# Patient Record
Sex: FEMALE | Race: BLACK OR AFRICAN AMERICAN | NOT HISPANIC OR LATINO | Employment: UNEMPLOYED | URBAN - METROPOLITAN AREA
[De-identification: names, ages, dates, MRNs, and addresses within clinical notes are randomized per-mention and may not be internally consistent; named-entity substitution may affect disease eponyms.]

---

## 2019-03-15 ENCOUNTER — HOSPITAL ENCOUNTER (EMERGENCY)
Facility: HOSPITAL | Age: 24
Discharge: HOME | End: 2019-03-15
Attending: EMERGENCY MEDICINE
Payer: MEDICAID

## 2019-03-15 VITALS
WEIGHT: 173.06 LBS | BODY MASS INDEX: 27.16 KG/M2 | SYSTOLIC BLOOD PRESSURE: 99 MMHG | TEMPERATURE: 98 F | HEART RATE: 75 BPM | OXYGEN SATURATION: 100 % | DIASTOLIC BLOOD PRESSURE: 58 MMHG | HEIGHT: 67 IN | RESPIRATION RATE: 18 BRPM

## 2019-03-15 DIAGNOSIS — K59.00 CONSTIPATION, UNSPECIFIED CONSTIPATION TYPE: Primary | ICD-10-CM

## 2019-03-15 DIAGNOSIS — R10.9 ABDOMINAL PAIN: ICD-10-CM

## 2019-03-15 LAB
B-HCG UR QL: NEGATIVE
BILIRUB UR QL STRIP: NEGATIVE
CLARITY UR: CLEAR
COLOR UR: YELLOW
GLUCOSE UR QL STRIP: NEGATIVE
HGB UR QL STRIP: NEGATIVE
KETONES UR QL STRIP: NEGATIVE
LEUKOCYTE ESTERASE UR QL STRIP: NEGATIVE
NITRITE UR QL STRIP: NEGATIVE
PH UR STRIP: 8 [PH] (ref 5–8)
PROT UR QL STRIP: NEGATIVE
SP GR UR STRIP: 1.01 (ref 1–1.03)
URN SPEC COLLECT METH UR: ABNORMAL
UROBILINOGEN UR STRIP-ACNC: ABNORMAL EU/DL

## 2019-03-15 PROCEDURE — 81003 URINALYSIS AUTO W/O SCOPE: CPT

## 2019-03-15 PROCEDURE — 81025 URINE PREGNANCY TEST: CPT

## 2019-03-15 PROCEDURE — 99284 EMERGENCY DEPT VISIT MOD MDM: CPT

## 2019-03-15 RX ORDER — DOCUSATE SODIUM 100 MG/1
100 CAPSULE, LIQUID FILLED ORAL 2 TIMES DAILY
Qty: 60 CAPSULE | Refills: 0 | COMMUNITY
Start: 2019-03-15

## 2019-03-15 NOTE — ED PROVIDER NOTES
"SCRIBE #1 NOTE: I, Alicia Ballesteros, am scribing for, and in the presence of, Marietta Sheth Do, MD. I have scribed the entire note.         History     Chief Complaint   Patient presents with    Abdominal Pain     Pt c/o cramping abdominal pain, reports constipation, states "I haven't had a normal BM in months, I have constipation issues, and I have not had a BM even after drinking MOM and trying other laxatives."       Review of patient's allergies indicates:  No Known Allergies      History of Present Illness   HPI    3/15/2019, 2:52 AM  History obtained from the patient      History of Present Illness: Katy Sweeney is a 23 y.o. female patient who presents to the Emergency Department for abdominal pain which onset gradually tonight.  Symptoms are constant and moderate in severity. No mitigating or exacerbating factors reported. Associated sxs include constipation. Patient states she has not had a good BM in months. Patient denies any fever, chills, dysuria, hematuria, hematochezia, N/V/D, and all other sxs at this time. Patient states she previously took milk of magnesia and would have a small BM, but has not taken it recently. Patient states she has not been evaluated by GI. No further complaints or concerns at this time.     Arrival mode: Personal vehicle      PCP: Rob Rg MD        Past Medical History:  Past medical history reviewed not relevant      Past Surgical History:  Past surgical history reviewed not relevant      Family History:  Family History   Problem Relation Age of Onset    Breast cancer Neg Hx     Colon cancer Neg Hx     Ovarian cancer Neg Hx        Social History:  Social History     Tobacco Use    Smoking status: Never Smoker    Smokeless tobacco: Never Used   Substance and Sexual Activity    Alcohol use: Yes    Drug use: Yes     Types: Marijuana    Sexual activity: Yes     Partners: Male     Birth control/protection: None        Review of Systems   Review of Systems "   Constitutional: Negative for chills and fever.   HENT: Negative for sore throat.    Respiratory: Negative for shortness of breath.    Cardiovascular: Negative for chest pain.   Gastrointestinal: Positive for abdominal pain and constipation. Negative for blood in stool, diarrhea, nausea and vomiting.   Genitourinary: Negative for dysuria and hematuria.   Musculoskeletal: Negative for back pain.   Skin: Negative for rash.   Neurological: Negative for weakness.   Hematological: Does not bruise/bleed easily.   All other systems reviewed and are negative.       Physical Exam     Initial Vitals [03/15/19 0226]   BP Pulse Resp Temp SpO2   118/69 77 18 98.1 °F (36.7 °C) 100 %      MAP       --          Physical Exam  Nursing Notes and Vital Signs Reviewed.  Constitutional: Patient is in no acute distress. Well-developed and well-nourished.  Head: Atraumatic. Normocephalic.  Eyes: PERRL. EOM intact. Conjunctivae are not pale. No scleral icterus.  ENT: Mucous membranes are moist. Oropharynx is clear and symmetric.    Neck: Supple. Full ROM. No lymphadenopathy.  Cardiovascular: Regular rate. Regular rhythm. No murmurs, rubs, or gallops. Distal pulses are 2+ and symmetric.  Pulmonary/Chest: No respiratory distress. Clear to auscultation bilaterally. No wheezing or rales.  Abdominal: Soft and non-distended.  There is no tenderness.  No rebound, guarding, or rigidity. Good bowel sounds.  Rectal:  No tenderness.  No masses.  No hemorrhoids.  Normal sphincter tone.  Vault is empty.  Musculoskeletal: Moves all extremities. No obvious deformities.   Skin: Warm and dry.  Neurological:  Alert, awake, and appropriate.  Normal speech.  No acute focal neurological deficits are appreciated.  Psychiatric: Normal affect. Good eye contact. Appropriate in content.     ED Course   Procedures  ED Vital Signs:  Vitals:    03/15/19 0226 03/15/19 0250 03/15/19 0300 03/15/19 0330   BP: 118/69 107/63 104/65 100/67   Pulse: 77  79 79   Resp: 18     "  Temp: 98.1 °F (36.7 °C)      TempSrc: Oral      SpO2: 100%  100% 100%   Weight: 78.5 kg (173 lb 1 oz)      Height: 5' 7" (1.702 m)       03/15/19 0400   BP: (!) 99/54   Pulse: 81   Resp:    Temp:    TempSrc:    SpO2: 100%   Weight:    Height:        Abnormal Lab Results:  Labs Reviewed   URINALYSIS, REFLEX TO URINE CULTURE - Abnormal; Notable for the following components:       Result Value    Urobilinogen, UA 4.0-6.0 (*)     All other components within normal limits    Narrative:     Preferred Collection Type->Urine, Clean Catch   PREGNANCY TEST, URINE RAPID        All Lab Results:  Results for orders placed or performed during the hospital encounter of 03/15/19   Urinalysis, Reflex to Urine Culture Urine, Clean Catch   Result Value Ref Range    Specimen UA Urine, Clean Catch     Color, UA Yellow Yellow, Straw, Quynh    Appearance, UA Clear Clear    pH, UA 8.0 5.0 - 8.0    Specific Gravity, UA 1.015 1.005 - 1.030    Protein, UA Negative Negative    Glucose, UA Negative Negative    Ketones, UA Negative Negative    Bilirubin (UA) Negative Negative    Occult Blood UA Negative Negative    Nitrite, UA Negative Negative    Urobilinogen, UA 4.0-6.0 (A) <2.0 EU/dL    Leukocytes, UA Negative Negative   Pregnancy, urine rapid   Result Value Ref Range    Preg Test, Ur Negative        Imaging Results:  Imaging Results          X-Ray Abdomen AP 1 View (KUB) (In process)                  Ordered, reviewed, and independently interpreted by the ED provider.  Study: X-ray Abdomen  Findings: NAF, stool noted, no free air or dilated loops or air fluid levels            The Emergency Provider reviewed the vital signs and test results, which are outlined above.     ED Discussion     4:22 AM: Reassessed pt at this time. Discussed with pt all pertinent ED information and results. Discussed pt dx and plan of tx. Gave pt all f/u and return to the ED instructions. All questions and concerns were addressed at this time. Pt expresses " understanding of information and instructions, and is comfortable with plan to discharge. Pt is stable for discharge.    I discussed with patient and/or family/caretaker that evaluation in the ED does not suggest any emergent or life threatening medical conditions requiring immediate intervention beyond what was provided in the ED, and I believe patient is safe for discharge.  Regardless, an unremarkable evaluation in the ED does not preclude the development or presence of a serious of life threatening condition. As such, patient was instructed to return immediately for any worsening or change in current symptoms.          ED Medication(s):  Medications - No data to display  New Prescriptions    DOCUSATE SODIUM (COLACE) 100 MG CAPSULE    Take 1 capsule (100 mg total) by mouth 2 (two) times daily.                Medical Decision Making     Medical Decision Making:   Clinical Tests:   Lab Tests: Ordered and Reviewed  Radiological Study: Ordered and Reviewed             Scribe Attestation:   Scribe #1: I performed the above scribed service and the documentation accurately describes the services I performed. I attest to the accuracy of the note.     Attending:   Physician Attestation Statement for Scribe #1: I, Marietta Sheth Do, MD, personally performed the services described in this documentation, as scribed by Alicia Ballesteros, in my presence, and it is both accurate and complete.           Clinical Impression       ICD-10-CM ICD-9-CM   1. Constipation, unspecified constipation type K59.00 564.00   2. Abdominal pain R10.9 789.00       Disposition:   Disposition: Discharged  Condition: Stable         Marietta Sheth Do, MD  03/15/19 0525

## 2021-01-09 ENCOUNTER — HOSPITAL ENCOUNTER (EMERGENCY)
Facility: HOSPITAL | Age: 26
Discharge: HOME | End: 2021-01-09
Attending: EMERGENCY MEDICINE
Payer: MEDICAID

## 2021-01-09 VITALS
SYSTOLIC BLOOD PRESSURE: 110 MMHG | HEART RATE: 80 BPM | TEMPERATURE: 100 F | WEIGHT: 180.69 LBS | RESPIRATION RATE: 20 BRPM | HEIGHT: 67 IN | DIASTOLIC BLOOD PRESSURE: 76 MMHG | OXYGEN SATURATION: 98 % | BODY MASS INDEX: 28.36 KG/M2

## 2021-01-09 DIAGNOSIS — K59.00 CONSTIPATION, UNSPECIFIED CONSTIPATION TYPE: ICD-10-CM

## 2021-01-09 DIAGNOSIS — R10.84 GENERALIZED ABDOMINAL PAIN: ICD-10-CM

## 2021-01-09 DIAGNOSIS — R10.9 ABDOMINAL PAIN: Primary | ICD-10-CM

## 2021-01-09 LAB
B-HCG UR QL: NEGATIVE
BILIRUB UR QL STRIP: NEGATIVE
CLARITY UR: CLEAR
COLOR UR: YELLOW
GLUCOSE UR QL STRIP: NEGATIVE
HGB UR QL STRIP: NEGATIVE
KETONES UR QL STRIP: NEGATIVE
LEUKOCYTE ESTERASE UR QL STRIP: NEGATIVE
NITRITE UR QL STRIP: NEGATIVE
PH UR STRIP: 7 [PH] (ref 5–8)
PROT UR QL STRIP: NEGATIVE
SP GR UR STRIP: >=1.03 (ref 1–1.03)
URN SPEC COLLECT METH UR: ABNORMAL
UROBILINOGEN UR STRIP-ACNC: 1 EU/DL

## 2021-01-09 PROCEDURE — 81025 URINE PREGNANCY TEST: CPT

## 2021-01-09 PROCEDURE — 81003 URINALYSIS AUTO W/O SCOPE: CPT

## 2021-01-09 PROCEDURE — 99284 EMERGENCY DEPT VISIT MOD MDM: CPT | Mod: 25

## 2021-01-09 RX ORDER — POLYETHYLENE GLYCOL 3350 17 G/17G
17 POWDER, FOR SOLUTION ORAL DAILY
Qty: 119 G | Refills: 0 | Status: SHIPPED | OUTPATIENT
Start: 2021-01-09 | End: 2021-01-16

## 2021-01-09 RX ORDER — DOCUSATE SODIUM 100 MG/1
100 CAPSULE, LIQUID FILLED ORAL 3 TIMES DAILY PRN
Qty: 30 CAPSULE | Refills: 0 | Status: SHIPPED | OUTPATIENT
Start: 2021-01-09

## 2021-01-09 RX ORDER — SIMETHICONE 125 MG
125 TABLET,CHEWABLE ORAL EVERY 6 HOURS PRN
Qty: 30 TABLET | Refills: 0 | Status: SHIPPED | OUTPATIENT
Start: 2021-01-09

## 2021-01-25 ENCOUNTER — HOSPITAL ENCOUNTER (EMERGENCY)
Facility: HOSPITAL | Age: 26
Discharge: HOME | End: 2021-01-25
Attending: EMERGENCY MEDICINE
Payer: MEDICAID

## 2021-01-25 VITALS
RESPIRATION RATE: 20 BRPM | SYSTOLIC BLOOD PRESSURE: 107 MMHG | WEIGHT: 179 LBS | DIASTOLIC BLOOD PRESSURE: 67 MMHG | BODY MASS INDEX: 28.04 KG/M2 | HEART RATE: 70 BPM | TEMPERATURE: 99 F | OXYGEN SATURATION: 95 %
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
## 2021-01-25 DIAGNOSIS — S06.0X0A CONCUSSION WITHOUT LOSS OF CONSCIOUSNESS, INITIAL ENCOUNTER: ICD-10-CM

## 2021-01-25 PROCEDURE — 99284 EMERGENCY DEPT VISIT MOD MDM: CPT | Mod: 25

## 2021-01-25 RX ORDER — NAPROXEN 375 MG/1
375 TABLET ORAL 2 TIMES DAILY WITH MEALS
Qty: 30 TABLET | Refills: 0 | Status: SHIPPED | OUTPATIENT
Start: 2021-01-25 | End: 2023-04-27 | Stop reason: SDUPTHER

## 2021-01-25 RX ORDER — ONDANSETRON 4 MG/1
4 TABLET, ORALLY DISINTEGRATING ORAL EVERY 6 HOURS PRN
Qty: 15 TABLET | Refills: 0 | OUTPATIENT
Start: 2021-01-25 | End: 2023-09-29

## 2021-03-15 ENCOUNTER — HOSPITAL ENCOUNTER (EMERGENCY)
Facility: HOSPITAL | Age: 26
Discharge: HOME | End: 2021-03-15
Attending: EMERGENCY MEDICINE
Payer: MEDICAID

## 2021-03-15 VITALS
BODY MASS INDEX: 27.68 KG/M2 | HEIGHT: 67 IN | RESPIRATION RATE: 16 BRPM | OXYGEN SATURATION: 96 % | TEMPERATURE: 98 F | WEIGHT: 176.38 LBS | SYSTOLIC BLOOD PRESSURE: 104 MMHG | DIASTOLIC BLOOD PRESSURE: 70 MMHG | HEART RATE: 76 BPM

## 2021-03-15 DIAGNOSIS — K59.00 CONSTIPATION, UNSPECIFIED CONSTIPATION TYPE: Primary | ICD-10-CM

## 2021-03-15 PROCEDURE — 99284 EMERGENCY DEPT VISIT MOD MDM: CPT

## 2021-03-15 RX ORDER — SYRING-NEEDL,DISP,INSUL,0.3 ML 29 G X1/2"
296 SYRINGE, EMPTY DISPOSABLE MISCELLANEOUS ONCE
Qty: 1 BOTTLE | Refills: 0 | Status: SHIPPED | OUTPATIENT
Start: 2021-03-15 | End: 2021-03-15

## 2023-03-06 ENCOUNTER — HOSPITAL ENCOUNTER (EMERGENCY)
Facility: HOSPITAL | Age: 28
Discharge: HOME | End: 2023-03-06
Attending: EMERGENCY MEDICINE
Payer: MEDICAID

## 2023-03-06 VITALS
HEIGHT: 67 IN | SYSTOLIC BLOOD PRESSURE: 119 MMHG | OXYGEN SATURATION: 97 % | RESPIRATION RATE: 20 BRPM | TEMPERATURE: 99 F | WEIGHT: 160 LBS | HEART RATE: 81 BPM | DIASTOLIC BLOOD PRESSURE: 67 MMHG | BODY MASS INDEX: 25.11 KG/M2

## 2023-03-06 DIAGNOSIS — J06.9 VIRAL URI WITH COUGH: Primary | ICD-10-CM

## 2023-03-06 LAB
B-HCG UR QL: NEGATIVE
CTP QC/QA: YES
MOLECULAR STREP A: NEGATIVE
POC MOLECULAR INFLUENZA A AGN: NEGATIVE
POC MOLECULAR INFLUENZA B AGN: NEGATIVE
SARS-COV-2 RDRP RESP QL NAA+PROBE: NEGATIVE

## 2023-03-06 PROCEDURE — 87502 INFLUENZA DNA AMP PROBE: CPT

## 2023-03-06 PROCEDURE — 25000003 PHARM REV CODE 250

## 2023-03-06 PROCEDURE — 87651 STREP A DNA AMP PROBE: CPT

## 2023-03-06 PROCEDURE — 81025 URINE PREGNANCY TEST: CPT | Performed by: EMERGENCY MEDICINE

## 2023-03-06 PROCEDURE — 99284 EMERGENCY DEPT VISIT MOD MDM: CPT

## 2023-03-06 RX ORDER — FLUTICASONE PROPIONATE 50 MCG
2 SPRAY, SUSPENSION (ML) NASAL DAILY
Qty: 15.8 ML | Refills: 0 | Status: SHIPPED | OUTPATIENT
Start: 2023-03-06 | End: 2023-03-06 | Stop reason: SDUPTHER

## 2023-03-06 RX ORDER — ACETAMINOPHEN 500 MG
1000 TABLET ORAL
Status: COMPLETED | OUTPATIENT
Start: 2023-03-06 | End: 2023-03-06

## 2023-03-06 RX ORDER — IBUPROFEN 600 MG/1
600 TABLET ORAL EVERY 6 HOURS PRN
Qty: 20 TABLET | Refills: 0 | Status: SHIPPED | OUTPATIENT
Start: 2023-03-06 | End: 2023-09-29

## 2023-03-06 RX ORDER — CETIRIZINE HYDROCHLORIDE 10 MG/1
10 TABLET ORAL DAILY
Qty: 30 TABLET | Refills: 0 | Status: SHIPPED | OUTPATIENT
Start: 2023-03-06 | End: 2023-03-06 | Stop reason: SDUPTHER

## 2023-03-06 RX ORDER — BENZONATATE 200 MG/1
200 CAPSULE ORAL 3 TIMES DAILY PRN
Qty: 30 CAPSULE | Refills: 0 | Status: SHIPPED | OUTPATIENT
Start: 2023-03-06 | End: 2023-03-16

## 2023-03-06 RX ORDER — CETIRIZINE HYDROCHLORIDE 10 MG/1
10 TABLET ORAL DAILY
Qty: 30 TABLET | Refills: 0 | Status: SHIPPED | OUTPATIENT
Start: 2023-03-06 | End: 2024-03-05

## 2023-03-06 RX ORDER — IBUPROFEN 600 MG/1
600 TABLET ORAL EVERY 6 HOURS PRN
Qty: 20 TABLET | Refills: 0 | Status: SHIPPED | OUTPATIENT
Start: 2023-03-06 | End: 2023-03-06 | Stop reason: SDUPTHER

## 2023-03-06 RX ORDER — GUAIFENESIN/DEXTROMETHORPHAN 100-10MG/5
5 SYRUP ORAL EVERY 6 HOURS PRN
Qty: 473 ML | Refills: 0 | Status: SHIPPED | OUTPATIENT
Start: 2023-03-06 | End: 2023-03-16

## 2023-03-06 RX ORDER — FLUTICASONE PROPIONATE 50 MCG
2 SPRAY, SUSPENSION (ML) NASAL DAILY
Qty: 15.8 ML | Refills: 0 | Status: SHIPPED | OUTPATIENT
Start: 2023-03-06 | End: 2023-04-05

## 2023-03-06 RX ORDER — BENZONATATE 200 MG/1
200 CAPSULE ORAL 3 TIMES DAILY PRN
Qty: 30 CAPSULE | Refills: 0 | Status: SHIPPED | OUTPATIENT
Start: 2023-03-06 | End: 2023-03-06 | Stop reason: SDUPTHER

## 2023-03-06 RX ORDER — ACETAMINOPHEN 500 MG
1000 TABLET ORAL EVERY 6 HOURS PRN
Qty: 56 TABLET | Refills: 0 | Status: SHIPPED | OUTPATIENT
Start: 2023-03-06 | End: 2023-03-13

## 2023-03-06 RX ORDER — ACETAMINOPHEN 500 MG
1000 TABLET ORAL EVERY 6 HOURS PRN
Qty: 56 TABLET | Refills: 0 | Status: SHIPPED | OUTPATIENT
Start: 2023-03-06 | End: 2023-03-06 | Stop reason: SDUPTHER

## 2023-03-06 RX ORDER — GUAIFENESIN/DEXTROMETHORPHAN 100-10MG/5
5 SYRUP ORAL EVERY 6 HOURS PRN
Qty: 473 ML | Refills: 0 | Status: SHIPPED | OUTPATIENT
Start: 2023-03-06 | End: 2023-03-06 | Stop reason: SDUPTHER

## 2023-03-06 RX ADMIN — ACETAMINOPHEN 1000 MG: 500 TABLET ORAL at 01:03

## 2023-03-06 NOTE — DISCHARGE INSTRUCTIONS
Thank you for coming to our Emergency Department today. It is important to remember that some problems are difficult to diagnose and may not be found during your first visit. Be sure to follow up with your primary care doctor and review any labs/imaging that was performed with them. If you do not have a primary care doctor, you may contact the one listed on your discharge paperwork or you may also call the [facility] Appointment Desk at [phone] to schedule an appointment with one.     All medications may potentially have side effects and it is impossible to predict which medications may give you side effects. If you feel that you are having a negative effect of any medication you should immediately stop taking them and seek medical attention.    Return to the ER with any questions/concerns, new/concerning symptoms, worsening or failure to improve. Do not drive or make any important decisions for 24 hours if you have received any pain medications, sedatives or mood altering drugs during your ER visit.

## 2023-03-06 NOTE — FIRST PROVIDER EVALUATION
Emergency Department TeleTriage Encounter Note      CHIEF COMPLAINT    Chief Complaint   Patient presents with    Sore Throat     Pt states she has had a sore throat since yesterday.       VITAL SIGNS   Initial Vitals [03/06/23 1124]   BP Pulse Resp Temp SpO2   108/69 89 18 98 °F (36.7 °C) --      MAP       --            ALLERGIES    Review of patient's allergies indicates:  No Known Allergies    PROVIDER TRIAGE NOTE  This is a teletriage evaluation of a 27 y.o. female presenting to the ED complaining of body aches, rhinorrhea, and sore throat since yesterday.  Has dry cough.     Managing secretions, no resp distress.     Initial orders will be placed and care will be transferred to an alternate provider when patient is roomed for a full evaluation. Any additional orders and the final disposition will be determined by that provider.         ORDERS  Labs Reviewed   POCT INFLUENZA A/B MOLECULAR   SARS-COV-2 RDRP GENE   POCT URINE PREGNANCY   POCT STREP A MOLECULAR       ED Orders (720h ago, onward)      Start Ordered     Status Ordering Provider    03/06/23 1127 03/06/23 1126  POCT Influenza A/B Molecular  Once         Ordered GRUPO HINTON    03/06/23 1127 03/06/23 1126  POCT COVID-19 Rapid Screening  Once         Ordered GRUPO HINTON    03/06/23 1127 03/06/23 1126  POCT urine pregnancy  Once         Ordered GRUPO HINTON    03/06/23 1127 03/06/23 1126  POCT Strep A, Molecular  Once         Ordered GRUPO HINTON              Virtual Visit Note: The provider triage portion of this emergency department evaluation and documentation was performed via [employer], a HIPAA-compliant telemedicine application, in concert with a tele-presenter in the room. A face to face patient evaluation with one of my colleagues will occur once the patient is placed in an emergency department room.      DISCLAIMER: This note was prepared with [employer] voice recognition transcription software. Garbled syntax, mangled pronouns, and  other bizarre constructions may be attributed to that software system.

## 2023-03-06 NOTE — ED TRIAGE NOTES
Pt to the ED with complaints of a sore throat since yesterday. Pt denies trouble breathing or fever/chills.

## 2023-03-07 NOTE — ED PROVIDER NOTES
Encounter Date: 3/6/2023       History     Chief Complaint   Patient presents with    Sore Throat     Pt states she has had a sore throat since yesterday.     Katy Sweeney is a 27-year-old female who presents to the emergency department with a chief complaint of upper respiratory symptoms.  She was in her normal state of health until last night when she had slow development of upper respiratory symptoms including headache, rhinorrhea, sore throat, myalgias, and sneezing.  She also reports subjective fever but does not know what her temperature was.  She denies any shortness of breath, dyspnea, or chest pain.  She has not taken any medications to attempt to alleviate her symptoms.  She has no medical history, does not take any daily medications, and has no known drug allergies.    The history is provided by the patient. No  was used.   Review of patient's allergies indicates:  No Known Allergies  History reviewed. No pertinent past medical history.  History reviewed. No pertinent surgical history.  Family History   Problem Relation Age of Onset    Breast cancer Neg Hx     Colon cancer Neg Hx     Ovarian cancer Neg Hx      Social History     Tobacco Use    Smoking status: Never    Smokeless tobacco: Never   Substance Use Topics    Alcohol use: Yes    Drug use: Yes     Types: Marijuana     Review of Systems   Constitutional:  Positive for chills and fever (Subjective).   HENT:  Positive for rhinorrhea, sneezing and sore throat.    Respiratory:  Negative for shortness of breath.    Cardiovascular:  Negative for chest pain.   Gastrointestinal:  Negative for nausea.   Genitourinary:  Negative for dysuria.   Musculoskeletal:  Positive for myalgias. Negative for back pain.   Skin:  Negative for rash.   Neurological:  Positive for headaches. Negative for weakness.   Hematological:  Does not bruise/bleed easily.     Physical Exam     Initial Vitals   BP Pulse Resp Temp SpO2   03/06/23 1124 03/06/23 1124  03/06/23 1124 03/06/23 1124 03/06/23 1312   108/69 89 18 98 °F (36.7 °C) 97 %      MAP       --                Physical Exam    Nursing note and vitals reviewed.  Constitutional: She appears well-developed and well-nourished. She is not diaphoretic. She is cooperative. She does not appear ill. No distress.   HENT:   Head: Normocephalic and atraumatic.   Right Ear: Hearing, tympanic membrane, external ear and ear canal normal. Tympanic membrane is not injected.   Left Ear: Hearing, tympanic membrane, external ear and ear canal normal. Tympanic membrane is not injected.   Nose: Mucosal edema and rhinorrhea present. No sinus tenderness. Right sinus exhibits no maxillary sinus tenderness and no frontal sinus tenderness. Left sinus exhibits no maxillary sinus tenderness and no frontal sinus tenderness.   Mouth/Throat: Uvula is midline, oropharynx is clear and moist and mucous membranes are normal. Mucous membranes are not dry. Normal dentition. No oropharyngeal exudate, posterior oropharyngeal edema or posterior oropharyngeal erythema.   Eyes: Conjunctivae and EOM are normal. Pupils are equal, round, and reactive to light.   Neck: Neck supple. No stridor present.    Full passive range of motion without pain.     Cardiovascular:  Normal rate, regular rhythm, S1 normal, S2 normal, normal heart sounds and normal pulses.           No murmur heard.  Pulses:       Radial pulses are 2+ on the right side and 2+ on the left side.        Dorsalis pedis pulses are 2+ on the right side and 2+ on the left side.   Pulmonary/Chest: Effort normal and breath sounds normal. No accessory muscle usage. No respiratory distress.   Abdominal: Abdomen is soft and flat. She exhibits no distension. There is no abdominal tenderness.   Musculoskeletal:      Cervical back: Full passive range of motion without pain and neck supple. No edema or rigidity. No muscular tenderness.     Neurological: She is alert.   Skin: Skin is warm and dry. Capillary  refill takes less than 2 seconds. No abrasion, no lesion and no rash noted.       ED Course   Procedures  Labs Reviewed   POCT INFLUENZA A/B MOLECULAR   SARS-COV-2 RDRP GENE   POCT URINE PREGNANCY   POCT STREP A MOLECULAR          Imaging Results    None          Medications   acetaminophen tablet 1,000 mg (1,000 mg Oral Given 3/6/23 8123)     Medical Decision Making:   Initial Assessment:   27-year-old female presenting with upper respiratory symptoms.  Differential Diagnosis:   Differential diagnosis includes but is not limited to respiratory infections including COVID, flu, bronchitis, rhinosinusitis, or pneumonia, or noninfectious processes such as asthma, COPD or seasonal allergies.   Clinical Tests:   Lab Tests: Ordered and Reviewed       <> Summary of Lab: COVID, flu, strep negative.  UPT negative.  ED Management:  Patient presenting to the emergency department with upper respiratory symptoms for the last day.  No worrisome symptoms such as dyspnea, shortness of breath, or chest pain.  Patient is clinically well-appearing.  All vital signs are within normal limits.  No acute distress.  Tested negative for COVID, flu, and strep.  Physical exam essentially normal.  Will treat as viral upper respiratory infection.  Patient given Tylenol in the emergency department.  Numerous symptomatic medications electronically prescribed and sent to the patient's preferred pharmacy.  She was agreeable to this plan of care.    Prior to discharge, patient was in no acute distress and all vital signs were within normal limits.  She was clinically well-appearing at this time. Return precautions were discussed, all patient questions were answered, and the patient was agreeable to the plan of care.  She was discharged home in stable condition and will follow up with her primary care provider or return to the emergency department if her symptoms worsen or do not improve.                         Clinical Impression:   Final  diagnoses:  [J06.9] Viral URI with cough (Primary)        ED Disposition Condition    Discharge Stable          ED Prescriptions       Medication Sig Dispense Start Date End Date Auth. Provider    fluticasone propionate (FLONASE) 50 mcg/actuation nasal spray  (Status: Discontinued) 2 sprays (100 mcg total) by Each Nostril route once daily. 15.8 mL 3/6/2023 3/6/2023 Charles Gerard PA-C    cetirizine (ZYRTEC) 10 MG tablet  (Status: Discontinued) Take 1 tablet (10 mg total) by mouth once daily. 30 tablet 3/6/2023 3/6/2023 Charles Gerard PA-C    benzocaine-menthoL 6-10 mg lozenge  (Status: Discontinued) Take 1 lozenge by mouth every 2 (two) hours as needed. 36 tablet 3/6/2023 3/6/2023 Charles Gerard PA-C    dextromethorphan-guaiFENesin  mg/5 ml (ROBITUSSIN-DM)  mg/5 mL liquid  (Status: Discontinued) Take 5 mLs by mouth every 6 (six) hours as needed (cough). 473 mL 3/6/2023 3/6/2023 Charles Gerard PA-C    benzonatate (TESSALON) 200 MG capsule  (Status: Discontinued) Take 1 capsule (200 mg total) by mouth 3 (three) times daily as needed for Cough. 30 capsule 3/6/2023 3/6/2023 Charles Gerard PA-C    ibuprofen (ADVIL,MOTRIN) 600 MG tablet  (Status: Discontinued) Take 1 tablet (600 mg total) by mouth every 6 (six) hours as needed for Pain. 20 tablet 3/6/2023 3/6/2023 Charles Gerard PA-C    acetaminophen (TYLENOL) 500 MG tablet  (Status: Discontinued) Take 2 tablets (1,000 mg total) by mouth every 6 (six) hours as needed for Pain. 56 tablet 3/6/2023 3/6/2023 Charles Gerard PA-C    acetaminophen (TYLENOL) 500 MG tablet Take 2 tablets (1,000 mg total) by mouth every 6 (six) hours as needed for Pain. 56 tablet 3/6/2023 3/13/2023 Charles Gerard PA-C    benzocaine-menthoL 6-10 mg lozenge Take 1 lozenge by mouth every 2 (two) hours as needed. 36 tablet 3/6/2023 -- Charles Gerard PA-C    benzonatate (TESSALON) 200 MG capsule Take 1 capsule (200 mg total) by mouth 3 (three) times daily as needed  for Cough. 30 capsule 3/6/2023 3/16/2023 Charles Gerard PA-C    cetirizine (ZYRTEC) 10 MG tablet Take 1 tablet (10 mg total) by mouth once daily. 30 tablet 3/6/2023 3/5/2024 Charles Gerard PA-C    dextromethorphan-guaiFENesin  mg/5 ml (ROBITUSSIN-DM)  mg/5 mL liquid Take 5 mLs by mouth every 6 (six) hours as needed (cough). 473 mL 3/6/2023 3/16/2023 Charles Gerard PA-C    fluticasone propionate (FLONASE) 50 mcg/actuation nasal spray 2 sprays (100 mcg total) by Each Nostril route once daily. 15.8 mL 3/6/2023 4/5/2023 Charles Gerard PA-C    ibuprofen (ADVIL,MOTRIN) 600 MG tablet Take 1 tablet (600 mg total) by mouth every 6 (six) hours as needed for Pain. 20 tablet 3/6/2023 -- Charles Gerard PA-C          Follow-up Information    None          Charles Gerard PA-C  03/06/23 1949

## 2023-04-27 ENCOUNTER — HOSPITAL ENCOUNTER (EMERGENCY)
Facility: HOSPITAL | Age: 28
Discharge: HOME | End: 2023-04-27
Attending: EMERGENCY MEDICINE
Payer: MEDICAID

## 2023-04-27 VITALS
SYSTOLIC BLOOD PRESSURE: 100 MMHG | TEMPERATURE: 99 F | OXYGEN SATURATION: 100 % | RESPIRATION RATE: 20 BRPM | DIASTOLIC BLOOD PRESSURE: 59 MMHG | WEIGHT: 181.13 LBS | HEIGHT: 67 IN | BODY MASS INDEX: 28.43 KG/M2 | HEART RATE: 64 BPM

## 2023-04-27 DIAGNOSIS — K59.00 CONSTIPATION, UNSPECIFIED CONSTIPATION TYPE: Primary | ICD-10-CM

## 2023-04-27 DIAGNOSIS — R07.9 CHEST PAIN: ICD-10-CM

## 2023-04-27 LAB
ALBUMIN SERPL BCP-MCNC: 3.4 G/DL (ref 3.5–5.2)
ALP SERPL-CCNC: 53 U/L (ref 55–135)
ALT SERPL W/O P-5'-P-CCNC: 7 U/L (ref 10–44)
ANION GAP SERPL CALC-SCNC: 8 MMOL/L (ref 8–16)
AST SERPL-CCNC: 12 U/L (ref 10–40)
BASOPHILS # BLD AUTO: 0.02 K/UL (ref 0–0.2)
BASOPHILS NFR BLD: 0.3 % (ref 0–1.9)
BILIRUB SERPL-MCNC: 0.2 MG/DL (ref 0.1–1)
BUN SERPL-MCNC: 11 MG/DL (ref 6–20)
CALCIUM SERPL-MCNC: 8.7 MG/DL (ref 8.7–10.5)
CHLORIDE SERPL-SCNC: 110 MMOL/L (ref 95–110)
CO2 SERPL-SCNC: 21 MMOL/L (ref 23–29)
CREAT SERPL-MCNC: 0.7 MG/DL (ref 0.5–1.4)
D DIMER PPP IA.FEU-MCNC: 0.27 MG/L FEU
DIFFERENTIAL METHOD: ABNORMAL
EOSINOPHIL # BLD AUTO: 0.1 K/UL (ref 0–0.5)
EOSINOPHIL NFR BLD: 1.3 % (ref 0–8)
ERYTHROCYTE [DISTWIDTH] IN BLOOD BY AUTOMATED COUNT: 13.7 % (ref 11.5–14.5)
EST. GFR  (NO RACE VARIABLE): >60 ML/MIN/1.73 M^2
GLUCOSE SERPL-MCNC: 94 MG/DL (ref 70–110)
HCT VFR BLD AUTO: 33.1 % (ref 37–48.5)
HGB BLD-MCNC: 10.6 G/DL (ref 12–16)
IMM GRANULOCYTES # BLD AUTO: 0.02 K/UL (ref 0–0.04)
IMM GRANULOCYTES NFR BLD AUTO: 0.3 % (ref 0–0.5)
LYMPHOCYTES # BLD AUTO: 2.8 K/UL (ref 1–4.8)
LYMPHOCYTES NFR BLD: 40.3 % (ref 18–48)
MCH RBC QN AUTO: 24.8 PG (ref 27–31)
MCHC RBC AUTO-ENTMCNC: 32 G/DL (ref 32–36)
MCV RBC AUTO: 78 FL (ref 82–98)
MONOCYTES # BLD AUTO: 0.4 K/UL (ref 0.3–1)
MONOCYTES NFR BLD: 6.3 % (ref 4–15)
NEUTROPHILS # BLD AUTO: 3.6 K/UL (ref 1.8–7.7)
NEUTROPHILS NFR BLD: 51.5 % (ref 38–73)
NRBC BLD-RTO: 0 /100 WBC
PLATELET # BLD AUTO: 184 K/UL (ref 150–450)
PMV BLD AUTO: 12 FL (ref 9.2–12.9)
POTASSIUM SERPL-SCNC: 4.1 MMOL/L (ref 3.5–5.1)
PROT SERPL-MCNC: 7.1 G/DL (ref 6–8.4)
RBC # BLD AUTO: 4.27 M/UL (ref 4–5.4)
SODIUM SERPL-SCNC: 139 MMOL/L (ref 136–145)
TROPONIN I SERPL DL<=0.01 NG/ML-MCNC: <0.006 NG/ML (ref 0–0.03)
WBC # BLD AUTO: 6.88 K/UL (ref 3.9–12.7)

## 2023-04-27 PROCEDURE — 85379 FIBRIN DEGRADATION QUANT: CPT | Performed by: EMERGENCY MEDICINE

## 2023-04-27 PROCEDURE — 93005 ELECTROCARDIOGRAM TRACING: CPT

## 2023-04-27 PROCEDURE — 84484 ASSAY OF TROPONIN QUANT: CPT | Performed by: EMERGENCY MEDICINE

## 2023-04-27 PROCEDURE — 93010 EKG 12-LEAD: ICD-10-PCS | Mod: ,,, | Performed by: INTERNAL MEDICINE

## 2023-04-27 PROCEDURE — 85025 COMPLETE CBC W/AUTO DIFF WBC: CPT | Performed by: EMERGENCY MEDICINE

## 2023-04-27 PROCEDURE — 80053 COMPREHEN METABOLIC PANEL: CPT | Performed by: EMERGENCY MEDICINE

## 2023-04-27 PROCEDURE — 93010 ELECTROCARDIOGRAM REPORT: CPT | Mod: ,,, | Performed by: INTERNAL MEDICINE

## 2023-04-27 PROCEDURE — 99285 EMERGENCY DEPT VISIT HI MDM: CPT | Mod: 25

## 2023-04-27 RX ORDER — NAPROXEN 375 MG/1
375 TABLET ORAL 2 TIMES DAILY WITH MEALS
Qty: 20 TABLET | Refills: 0 | Status: SHIPPED | OUTPATIENT
Start: 2023-04-27 | End: 2023-09-29

## 2023-04-27 NOTE — ED PROVIDER NOTES
SCRIBE #1 NOTE: I, Iesha Harley, am scribing for, and in the presence of, Shell Rg MD. I have scribed the entire note.       History     Chief Complaint   Patient presents with    Abdominal Pain     Pt complaining of abdominal and left flank pain x2 days. Pt also reports constipation. Pt -N/V. Last bowel movement possibly 2 months ago     Review of patient's allergies indicates:  No Known Allergies      History of Present Illness     HPI    4/27/2023, 3:55 AM  History obtained from the patient      History of Present Illness: Katy Sweeney is a 27 y.o. female patient who presents to the Emergency Department for evaluation of abd pain which onset 2 days PTA. Symptoms are constant and moderate in severity. She also states that she started having a stabbing, sharp chest pain on the right side 2 days ago. She took pain medicine but it did not help. She does not have regular bowel movements and states she has them once every month. Pt has tried miralax and milk of magnesia, none of which have worked. Patient denies any n/v, and all other sxs at this time. No birth control or recent long car rides/travel. She is currently 4 days into her menstrual cycle. FMHx includes heart diseases/disorders from grandmother and colon cancer and diabetes from father. No further complaints or concerns at this time.       Arrival mode: Personal vehicle      PCP: Rob Rg MD        Past Medical History:  History reviewed. No pertinent past medical history.    Past Surgical History:  History reviewed. No pertinent surgical history.      Family History:  Family History   Problem Relation Age of Onset    Breast cancer Neg Hx     Colon cancer Neg Hx     Ovarian cancer Neg Hx        Social History:  Social History     Tobacco Use    Smoking status: Never    Smokeless tobacco: Never   Substance and Sexual Activity    Alcohol use: Yes    Drug use: Yes     Types: Marijuana    Sexual activity: Yes     Partners: Male      "Birth control/protection: None        Review of Systems     Review of Systems   Cardiovascular:  Positive for chest pain.   Gastrointestinal:  Positive for abdominal pain and constipation. Negative for nausea and vomiting.      Physical Exam     Initial Vitals [04/27/23 0331]   BP Pulse Resp Temp SpO2   117/64 75 18 98.4 °F (36.9 °C) 98 %      MAP       --          Physical Exam  Nursing Notes and Vital Signs Reviewed.  Constitutional: Patient is in no acute distress. Well-developed and well-nourished.  Head: Atraumatic. Normocephalic.  Eyes: PERRL. EOM intact. Conjunctivae are not pale. No scleral icterus.  ENT: Mucous membranes are moist. Oropharynx is clear and symmetric.    Neck: Supple. Full ROM. No lymphadenopathy.  Cardiovascular: Regular rate. Regular rhythm. No murmurs, rubs, or gallops. Distal pulses are 2+ and symmetric.  Pulmonary/Chest: No respiratory distress. Clear to auscultation bilaterally. No wheezing or rales. Right anterior chest wall tenderness to palpation  Abdominal: Soft and non-distended.  There is no tenderness.  No rebound, guarding, or rigidity. Good bowel sounds.  Genitourinary: No CVA tenderness  Musculoskeletal: Moves all extremities. No obvious deformities. No edema. No calf tenderness.  Skin: Warm and dry.  Neurological:  Alert, awake, and appropriate.  Normal speech.  No acute focal neurological deficits are appreciated.  Psychiatric: Normal affect. Good eye contact. Appropriate in content.     ED Course   Procedures  ED Vital Signs:  Vitals:    04/27/23 0331 04/27/23 0418   BP: 117/64    Pulse: 75 65   Resp: 18    Temp: 98.4 °F (36.9 °C)    TempSrc: Oral    SpO2: 98%    Weight: 82.2 kg (181 lb 1.7 oz)    Height: 5' 7" (1.702 m)        Abnormal Lab Results:  Labs Reviewed   CBC W/ AUTO DIFFERENTIAL - Abnormal; Notable for the following components:       Result Value    Hemoglobin 10.6 (*)     Hematocrit 33.1 (*)     MCV 78 (*)     MCH 24.8 (*)     All other components within normal " limits   COMPREHENSIVE METABOLIC PANEL - Abnormal; Notable for the following components:    CO2 21 (*)     Albumin 3.4 (*)     Alkaline Phosphatase 53 (*)     ALT 7 (*)     All other components within normal limits   TROPONIN I   D DIMER, QUANTITATIVE        All Lab Results:  Results for orders placed or performed during the hospital encounter of 04/27/23   CBC auto differential   Result Value Ref Range    WBC 6.88 3.90 - 12.70 K/uL    RBC 4.27 4.00 - 5.40 M/uL    Hemoglobin 10.6 (L) 12.0 - 16.0 g/dL    Hematocrit 33.1 (L) 37.0 - 48.5 %    MCV 78 (L) 82 - 98 fL    MCH 24.8 (L) 27.0 - 31.0 pg    MCHC 32.0 32.0 - 36.0 g/dL    RDW 13.7 11.5 - 14.5 %    Platelets 184 150 - 450 K/uL    MPV 12.0 9.2 - 12.9 fL    Immature Granulocytes 0.3 0.0 - 0.5 %    Gran # (ANC) 3.6 1.8 - 7.7 K/uL    Immature Grans (Abs) 0.02 0.00 - 0.04 K/uL    Lymph # 2.8 1.0 - 4.8 K/uL    Mono # 0.4 0.3 - 1.0 K/uL    Eos # 0.1 0.0 - 0.5 K/uL    Baso # 0.02 0.00 - 0.20 K/uL    nRBC 0 0 /100 WBC    Gran % 51.5 38.0 - 73.0 %    Lymph % 40.3 18.0 - 48.0 %    Mono % 6.3 4.0 - 15.0 %    Eosinophil % 1.3 0.0 - 8.0 %    Basophil % 0.3 0.0 - 1.9 %    Differential Method Automated    Comprehensive metabolic panel   Result Value Ref Range    Sodium 139 136 - 145 mmol/L    Potassium 4.1 3.5 - 5.1 mmol/L    Chloride 110 95 - 110 mmol/L    CO2 21 (L) 23 - 29 mmol/L    Glucose 94 70 - 110 mg/dL    BUN 11 6 - 20 mg/dL    Creatinine 0.7 0.5 - 1.4 mg/dL    Calcium 8.7 8.7 - 10.5 mg/dL    Total Protein 7.1 6.0 - 8.4 g/dL    Albumin 3.4 (L) 3.5 - 5.2 g/dL    Total Bilirubin 0.2 0.1 - 1.0 mg/dL    Alkaline Phosphatase 53 (L) 55 - 135 U/L    AST 12 10 - 40 U/L    ALT 7 (L) 10 - 44 U/L    Anion Gap 8 8 - 16 mmol/L    eGFR >60 >60 mL/min/1.73 m^2   Troponin I   Result Value Ref Range    Troponin I <0.006 0.000 - 0.026 ng/mL   D dimer, quantitative   Result Value Ref Range    D-Dimer 0.27 <0.50 mg/L FEU        Imaging Results:  Imaging Results              X-Ray Chest AP  Portable (In process)                    4:56 AM: Per ED physician, pt's CXR results: No acute findings.     The EKG was ordered, reviewed, and independently interpreted by the ED provider.  Interpretation time: 4:11  Rate: 67 BPM  Rhythm:  Normal sinus rhythm with sinus arrhythmia  Interpretation: No acute ST changes. No STEMI.             The Emergency Provider reviewed the vital signs and test results, which are outlined above.     ED Discussion     5:28 AM: Reassessed pt at this time. Discussed with pt all pertinent ED information and results. Discussed pt dx and plan of tx. Gave pt all f/u and return to the ED instructions. All questions and concerns were addressed at this time. Pt expresses understanding of information and instructions, and is comfortable with plan to discharge. Pt is stable for discharge.    I discussed with patient and/or family/caretaker that evaluation in the ED does not suggest any emergent or life threatening medical conditions requiring immediate intervention beyond what was provided in the ED, and I believe patient is safe for discharge.  Regardless, an unremarkable evaluation in the ED does not preclude the development or presence of a serious of life threatening condition. As such, patient was instructed to return immediately for any worsening or change in current symptoms.        Medical Decision Making:   Clinical Tests:   Lab Tests: Ordered and Reviewed  Radiological Study: Ordered and Reviewed  Medical Tests: Ordered and Reviewed         ED Medication(s):  Medications - No data to display    Current Discharge Medication List           Follow-up Information       Rob Rg MD In 4 days.    Specialty: Internal Medicine  Contact information:  [facility] [address] [phone]               [facility] GASTROENTEROLOGY In 4 days.    Specialty: Gastroenterology  Contact information:  [address] [place] [address] [phone]              [facility] - Emergency Dept..    Specialty: Emergency Medicine  Why: As needed, If symptoms worsen  Contact information:  [address] [facility] [address] [phone]                               Scribe Attestation:   Scribe #1: I performed the above scribed service and the documentation accurately describes the services I performed. I attest to the accuracy of the note.     Attending:   Physician Attestation Statement for Scribe #1: I, Shell Rg MD, personally performed the services described in this documentation, as scribed by Iesha Harley, in my presence, and it is both accurate and complete.           Clinical Impression       ICD-10-CM ICD-9-CM   1. Constipation, unspecified constipation type  K59.00 564.00   2. Chest pain  R07.9 786.50       Disposition:   Disposition: Discharged  Condition: Stable      Shell Rg MD  04/27/23 0551

## 2023-04-28 ENCOUNTER — PATIENT OUTREACH (OUTPATIENT)
Dept: EMERGENCY MEDICINE | Facility: HOSPITAL | Age: 28
End: 2023-04-28
Payer: MEDICAID

## 2023-05-08 NOTE — PROGRESS NOTES
Iesha De Jesus  ED Navigator  Emergency Department    Project: [facility] ED Navigator  Role: Community Health Worker    Date: 05/08/2023  Patient Name: Katy Sweeney  MRN: [id number]  PCP: [facility]    Assessment:     Katy Sweeney is a 27 y.o. female who has presented to ED for abdominal pain/constipation. Patient has visited the ED 2 times in the past 3 months. Patient did contact PCP.     ED Navigator Initial Assessment    ED Navigator Enrollment Documentation  Consent to Services  Does patient consent to completing the assessment?: Yes  Contact  Method of Initial Contact: Phone  Transportation  Does the patient have issues with Transportation?: No  Does the patient have transportation to and from healthcare appointments?: Yes  Insurance Coverage  Do you have coverage/adequate coverage?: Yes  Type/kind of coverage: Medicaid/UHC  Is patient able to afford co-pays/deductibles?: Yes  Is patient able to afford HME or supplies?: Yes  Does patient have an established [facility] PCP?: Yes  Able to access?: Yes  Does the patient have a lack of adequate coverage?: No  Specialist Appointment  Did the patient come to the ED to see a specialist?: No  Does the patient have a pending specialist referral?: Yes (Comment: Gastro)  Does the patient have a specialist appointment made?: Yes  Is the patient able to access a timely specialist appointment?: Yes  PCP Follow Up Appointment  Has the patient had an appointment with a primary care provider in the past year?: Yes  Approximate date: 3/8/23  Provider: [facility]  Does the patient have a follow up appontment with a PCP?: Yes  Upcoming appointment date: 5/8/23  Provider: [facility]  When was the last time you saw your PCP?: 3/8/23  Why does the patient not have a follow up scheduled?: Other (see comments)  Medications  Is patient able to afford medication?: Yes  Is patient unable to get medication due to lack of  transportation?: No  Psychological  Does the patient have psycho-social concerns?: Yes  What concerns does the patient have?: Other (see comments), Anxiety and/or Depression (Comment: Pt states that she is experiencing stress, anxiety and depression and would like appts for mental health med/counseling)  Food  Does the patient have concerns about food?: Yes (Comment: Pt states that sometimes food is an issue)  What concerns does the patient have regarding food?: Lack of food  Communication/Education  Does the patient have limited English proficiency/English not primary language?: No  Does patient have low literacy and/or low health literacy?: Yes (Comment: Low health literacy/frequent ED visits/pcp changed recently)  Other Financial Concerns  Does the patient have immediate financial distress?: No  Does the patient have general financial concerns?: Yes (Comment: food, housing and utilities)  Other Social Barriers/Concerns  Does the patient have any additional barriers or concerns?: Unable to afford utilities, Housing concerns (Comment: food, housing and utilities)  Primary Barrier  Barriers identified: Financial barrier (health insurance, employment, etc.), Cognitive barrier (health literacy, language and communication, etc.), Psychological barrier (mistrust, anxiety, etc.) (Comment: Low health literacy/frequent ED visits/stress, anxiety and depression/food, housing and utilities)  Root Cause of ED Utilization: Patient Knowledge/Low Health Literacy  Plan to address Patient Knowledge/Low Health Literacy: Provided information for [facility] 24/7 Nurse triage line [phone] or 1-866-[facility] ([phone])  Next steps: Provided Education  Was education/educational materials provided surrounding PCP services/creating a medical home?: Yes Was education verbal or written?: Written     Was education/educational materials provided surrounding low cost, healthy foods?: Yes Was education verbal or written?: Written      Was education/educational materials provided surrounding other items? If so, use comment to explain.: Yes (Comment: Urgent care) Was education verbal or written?: Written   Plan: Provided information for [facility] 24/7 Nurse triage line, [phone] or 1-866-[facility] ([phone])  Expected Date of Follow Up 1: 5/31/23  Additional Documentation: I spoke with patient regarding ED visit on 4/27/23 for abdominal pain/constipation. Pt states that she did follow up with pcp and has an appt scheduled 5/8/23. Pt said that she had a gastro appt and xray on 4/27/23 and is following up with the doctor. Pt states that she is experiencing high stress and anxiety and does not have a good support system. Pt accepted scheduling assistance for an appt with mental health medication management and counseling at [facility]. Pt states that she does not exercise regularly, does not smoke and does drink occasionally. Pt said that she does have difficulty with food, housing and utilities at times. Pt has no additional resource needs at this time and was provided resources for urgent care, stress, depression, food, housing, utilities, establishing a healthcare home, along with [facility] form, [facility] Virtual Visit flyer, [facility] 24/[facility]#, [facility] Nurse Triage #, and Healthy [facility] diet educational information.    Iesha De Jesus           Social History     Socioeconomic History    Marital status: Single   Tobacco Use    Smoking status: Never    Smokeless tobacco: Never   Substance and Sexual Activity    Alcohol use: Yes    Drug use: Yes     Types: Marijuana    Sexual activity: Yes     Partners: Male     Birth control/protection: None     Social Determinants of Health     Financial Resource Strain: Medium Risk    Difficulty of Paying Living Expenses: Somewhat hard   Food Insecurity: Food Insecurity Present    Worried About Running Out of Food in the Last Year: Sometimes true    Ran Out of Food in the  Last Year: Sometimes true   Transportation Needs: No Transportation Needs    Lack of Transportation (Medical): No    Lack of Transportation (Non-Medical): No   Physical Activity: Inactive    Days of Exercise per Week: 0 days    Minutes of Exercise per Session: 0 min   Stress: Stress Concern Present    Feeling of Stress : Very much   Social Connections: Unknown    Frequency of Communication with Friends and Family: Never    Frequency of Social Gatherings with Friends and Family: Never    Attends Zoroastrianism Services: Patient refused    Active Member of Clubs or Organizations: Patient refused    Attends Club or Organization Meetings: Patient refused    Marital Status: Never    Housing Stability: High Risk    Unable to Pay for Housing in the Last Year: Yes    Unstable Housing in the Last Year: No       Plan:   I spoke with patient regarding ED visit on 4/27/23 for abdominal pain/constipation. Pt states that she did follow up with pcp and has an appt scheduled 5/8/23. Pt said that she had a gastro appt and xray on 4/27/23 and is following up with the doctor. Pt states that she is experiencing high stress and anxiety and does not have a good support system. Pt accepted scheduling assistance for an appt with mental health medication management and counseling at [facility]. Pt states that she does not exercise regularly, does not smoke and does drink occasionally. Pt said that she does have difficulty with food, housing and utilities at times. Pt has no additional resource needs at this time and was provided resources for urgent care, stress, depression, food, housing, utilities, establishing a healthcare home, along with Right [facility] Right Place form, [facility] Virtual Visit flyer, [facility] 24/[facility]#, [facility] Nurse Triage #, and Healthy Heart diet educational information.    Iesha De Jesus    Appointment made with: [facility]

## 2023-07-11 ENCOUNTER — PATIENT MESSAGE (OUTPATIENT)
Dept: RESEARCH | Facility: HOSPITAL | Age: 28
End: 2023-07-11
Payer: MEDICAID

## 2023-07-14 ENCOUNTER — HOSPITAL ENCOUNTER (EMERGENCY)
Facility: HOSPITAL | Age: 28
Discharge: HOME | End: 2023-07-14
Attending: EMERGENCY MEDICINE
Payer: MEDICAID

## 2023-07-14 VITALS
SYSTOLIC BLOOD PRESSURE: 111 MMHG | TEMPERATURE: 98 F | BODY MASS INDEX: 28.58 KG/M2 | OXYGEN SATURATION: 100 % | DIASTOLIC BLOOD PRESSURE: 75 MMHG | WEIGHT: 182.13 LBS | HEART RATE: 109 BPM | HEIGHT: 67 IN | RESPIRATION RATE: 18 BRPM

## 2023-07-14 DIAGNOSIS — R10.84 GENERALIZED ABDOMINAL PAIN: Primary | ICD-10-CM

## 2023-07-14 LAB
ALBUMIN SERPL BCP-MCNC: 3.7 G/DL (ref 3.5–5.2)
ALP SERPL-CCNC: 56 U/L (ref 55–135)
ALT SERPL W/O P-5'-P-CCNC: 11 U/L (ref 10–44)
ANION GAP SERPL CALC-SCNC: 12 MMOL/L (ref 8–16)
AST SERPL-CCNC: 16 U/L (ref 10–40)
B-HCG UR QL: NEGATIVE
BASOPHILS # BLD AUTO: 0.03 K/UL (ref 0–0.2)
BASOPHILS NFR BLD: 0.5 % (ref 0–1.9)
BILIRUB SERPL-MCNC: 0.3 MG/DL (ref 0.1–1)
BILIRUB UR QL STRIP: NEGATIVE
BUN SERPL-MCNC: 14 MG/DL (ref 6–20)
CALCIUM SERPL-MCNC: 9.2 MG/DL (ref 8.7–10.5)
CHLORIDE SERPL-SCNC: 106 MMOL/L (ref 95–110)
CLARITY UR: CLEAR
CO2 SERPL-SCNC: 20 MMOL/L (ref 23–29)
COLOR UR: YELLOW
CREAT SERPL-MCNC: 0.8 MG/DL (ref 0.5–1.4)
DIFFERENTIAL METHOD: ABNORMAL
EOSINOPHIL # BLD AUTO: 0 K/UL (ref 0–0.5)
EOSINOPHIL NFR BLD: 0.6 % (ref 0–8)
ERYTHROCYTE [DISTWIDTH] IN BLOOD BY AUTOMATED COUNT: 14.3 % (ref 11.5–14.5)
EST. GFR  (NO RACE VARIABLE): >60 ML/MIN/1.73 M^2
GLUCOSE SERPL-MCNC: 83 MG/DL (ref 70–110)
GLUCOSE UR QL STRIP: NEGATIVE
HCT VFR BLD AUTO: 32.3 % (ref 37–48.5)
HCV AB SERPL QL IA: NEGATIVE
HEP C VIRUS HOLD SPECIMEN: NORMAL
HGB BLD-MCNC: 10.5 G/DL (ref 12–16)
HGB UR QL STRIP: NEGATIVE
HIV 1+2 AB+HIV1 P24 AG SERPL QL IA: NEGATIVE
IMM GRANULOCYTES # BLD AUTO: 0.02 K/UL (ref 0–0.04)
IMM GRANULOCYTES NFR BLD AUTO: 0.3 % (ref 0–0.5)
KETONES UR QL STRIP: ABNORMAL
LEUKOCYTE ESTERASE UR QL STRIP: NEGATIVE
LIPASE SERPL-CCNC: 15 U/L (ref 4–60)
LYMPHOCYTES # BLD AUTO: 2.4 K/UL (ref 1–4.8)
LYMPHOCYTES NFR BLD: 37.2 % (ref 18–48)
MCH RBC QN AUTO: 24.6 PG (ref 27–31)
MCHC RBC AUTO-ENTMCNC: 32.5 G/DL (ref 32–36)
MCV RBC AUTO: 76 FL (ref 82–98)
MONOCYTES # BLD AUTO: 0.4 K/UL (ref 0.3–1)
MONOCYTES NFR BLD: 6.8 % (ref 4–15)
NEUTROPHILS # BLD AUTO: 3.6 K/UL (ref 1.8–7.7)
NEUTROPHILS NFR BLD: 54.6 % (ref 38–73)
NITRITE UR QL STRIP: NEGATIVE
NRBC BLD-RTO: 0 /100 WBC
PH UR STRIP: 6 [PH] (ref 5–8)
PLATELET # BLD AUTO: 169 K/UL (ref 150–450)
PMV BLD AUTO: 10.9 FL (ref 9.2–12.9)
POTASSIUM SERPL-SCNC: 3.9 MMOL/L (ref 3.5–5.1)
PROT SERPL-MCNC: 7.3 G/DL (ref 6–8.4)
PROT UR QL STRIP: NEGATIVE
RBC # BLD AUTO: 4.27 M/UL (ref 4–5.4)
SODIUM SERPL-SCNC: 138 MMOL/L (ref 136–145)
SP GR UR STRIP: 1.02 (ref 1–1.03)
URN SPEC COLLECT METH UR: ABNORMAL
UROBILINOGEN UR STRIP-ACNC: NEGATIVE EU/DL
WBC # BLD AUTO: 6.5 K/UL (ref 3.9–12.7)

## 2023-07-14 PROCEDURE — 81003 URINALYSIS AUTO W/O SCOPE: CPT | Performed by: EMERGENCY MEDICINE

## 2023-07-14 PROCEDURE — 85025 COMPLETE CBC W/AUTO DIFF WBC: CPT | Performed by: EMERGENCY MEDICINE

## 2023-07-14 PROCEDURE — 81025 URINE PREGNANCY TEST: CPT | Performed by: EMERGENCY MEDICINE

## 2023-07-14 PROCEDURE — 99284 EMERGENCY DEPT VISIT MOD MDM: CPT | Mod: 25

## 2023-07-14 PROCEDURE — 25000003 PHARM REV CODE 250: Performed by: EMERGENCY MEDICINE

## 2023-07-14 PROCEDURE — 83690 ASSAY OF LIPASE: CPT | Performed by: EMERGENCY MEDICINE

## 2023-07-14 PROCEDURE — 80053 COMPREHEN METABOLIC PANEL: CPT | Performed by: EMERGENCY MEDICINE

## 2023-07-14 PROCEDURE — 87389 HIV-1 AG W/HIV-1&-2 AB AG IA: CPT | Performed by: EMERGENCY MEDICINE

## 2023-07-14 PROCEDURE — 63600175 PHARM REV CODE 636 W HCPCS: Performed by: EMERGENCY MEDICINE

## 2023-07-14 PROCEDURE — 96361 HYDRATE IV INFUSION ADD-ON: CPT

## 2023-07-14 PROCEDURE — 86803 HEPATITIS C AB TEST: CPT | Performed by: EMERGENCY MEDICINE

## 2023-07-14 PROCEDURE — 96374 THER/PROPH/DIAG INJ IV PUSH: CPT

## 2023-07-14 RX ORDER — PROCHLORPERAZINE EDISYLATE 5 MG/ML
10 INJECTION INTRAMUSCULAR; INTRAVENOUS
Status: COMPLETED | OUTPATIENT
Start: 2023-07-14 | End: 2023-07-14

## 2023-07-14 RX ORDER — METOCLOPRAMIDE 10 MG/1
10 TABLET ORAL EVERY 6 HOURS PRN
Qty: 20 TABLET | Refills: 0 | Status: SHIPPED | OUTPATIENT
Start: 2023-07-14

## 2023-07-14 RX ORDER — HYOSCYAMINE SULFATE 0.12 MG/1
0.12 TABLET SUBLINGUAL
Status: COMPLETED | OUTPATIENT
Start: 2023-07-14 | End: 2023-07-14

## 2023-07-14 RX ADMIN — PROCHLORPERAZINE EDISYLATE 10 MG: 5 INJECTION INTRAMUSCULAR; INTRAVENOUS at 03:07

## 2023-07-14 RX ADMIN — SODIUM CHLORIDE 1000 ML: 9 INJECTION, SOLUTION INTRAVENOUS at 03:07

## 2023-07-14 RX ADMIN — HYOSCYAMINE SULFATE 0.12 MG: 0.12 TABLET ORAL at 03:07

## 2023-07-14 NOTE — ED PROVIDER NOTES
"SCRIBE #1 NOTE: I, Phong Tanja, am scribing for, and in the presence of, Shell Rg MD. I have scribed the entire note.       History     Chief Complaint   Patient presents with    Abdominal Pain     Pt CO N&V and constipation x2 days. CO upper epigastric pain after eating [employer] 2 days prior. Hx abd issues.     Review of patient's allergies indicates:  No Known Allergies      History of Present Illness     HPI    7/14/2023, 3:22 AM  History obtained from the patient      History of Present Illness: Katy Sweeney is a 27 y.o. female patient who presents to the Emergency Department for evaluation of abdominal pain which onset gradually 3 days ago. Pt describes her pain to be upper and lower abdominal pain with n/v and constipation. She notes her pain worsens with eating. She reports her stool color as "dark." She notes her LMC was 3 weeks ago. Symptoms are constant and moderate in severity. No mitigating or exacerbating factors reported. Patient denies any fever, chills, CP, dysuria, hematuria, and all other sxs at this time. No further complaints or concerns at this time.       Arrival mode: Personal vehicle    PCP: [facility]        Past Medical History:  No past medical history on file.    Past Surgical History:  No past surgical history on file.      Family History:  Family History   Problem Relation Age of Onset    Breast cancer Neg Hx     Colon cancer Neg Hx     Ovarian cancer Neg Hx        Social History:  Social History     Tobacco Use    Smoking status: Never    Smokeless tobacco: Never   Substance and Sexual Activity    Alcohol use: Yes    Drug use: Yes     Types: Marijuana    Sexual activity: Yes     Partners: Male     Birth control/protection: None        Review of Systems     Review of Systems   Constitutional:  Negative for fever.   HENT:  Negative for sore throat.    Respiratory:  Negative for shortness of breath.    Cardiovascular:  Negative for chest pain. " "  Gastrointestinal:  Positive for abdominal pain (upper and lower), constipation, nausea and vomiting. Negative for diarrhea.   Genitourinary:  Negative for dysuria and vaginal discharge.   Musculoskeletal:  Negative for back pain.   Skin:  Negative for rash.   Neurological:  Negative for weakness.   Hematological:  Does not bruise/bleed easily.   All other systems reviewed and are negative.     Physical Exam     Initial Vitals [07/14/23 0241]   BP Pulse Resp Temp SpO2   (!) 111/53 75 16 98.2 °F (36.8 °C) 98 %      MAP       --          Physical Exam   Nursing Notes and Vital Signs Reviewed.  Constitutional: Patient is in no acute distress. Well-developed and well-nourished.  Head: Atraumatic. Normocephalic.  Eyes: PERRL. EOM intact. Conjunctivae are not pale. No scleral icterus.  ENT: Mucous membranes are moist. Oropharynx is clear and symmetric.    Neck: Supple. Full ROM. No lymphadenopathy.  Cardiovascular: Regular rate. Regular rhythm. No murmurs, rubs, or gallops. Distal pulses are 2+ and symmetric.  Pulmonary/Chest: No respiratory distress. Clear to auscultation bilaterally. No wheezing or rales.  Abdominal: Soft and non-distended.  There is generalized tenderness.  No rebound, guarding, or rigidity. Good bowel sounds.  Genitourinary: No CVA tenderness  Musculoskeletal: Moves all extremities. No obvious deformities. No edema. No calf tenderness.  Skin: Warm and dry.  Neurological:  Alert, awake, and appropriate.  Normal speech.  No acute focal neurological deficits are appreciated.  Psychiatric: Normal affect. Good eye contact. Appropriate in content.     ED Course   Procedures  ED Vital Signs:  Vitals:    07/14/23 0241 07/14/23 0502   BP: (!) 111/53 111/75   Pulse: 75 109   Resp: 16 18   Temp: 98.2 °F (36.8 °C)    TempSrc: Oral    SpO2: 98% 100%   Weight: 82.6 kg (182 lb 1.6 oz)    Height: 5' 7" (1.702 m)        Abnormal Lab Results:  Labs Reviewed   CBC W/ AUTO DIFFERENTIAL - Abnormal; Notable for the " following components:       Result Value    Hemoglobin 10.5 (*)     Hematocrit 32.3 (*)     MCV 76 (*)     MCH 24.6 (*)     All other components within normal limits   COMPREHENSIVE METABOLIC PANEL - Abnormal; Notable for the following components:    CO2 20 (*)     All other components within normal limits   URINALYSIS, REFLEX TO URINE CULTURE - Abnormal; Notable for the following components:    Ketones, UA 1+ (*)     All other components within normal limits    Narrative:     Specimen Source->Urine   HIV 1 / 2 ANTIBODY    Narrative:     Release to patient->Immediate   HEPATITIS C ANTIBODY    Narrative:     Release to patient->Immediate   HEP C VIRUS HOLD SPECIMEN    Narrative:     Release to patient->Immediate   LIPASE   PREGNANCY TEST, URINE RAPID    Narrative:     Specimen Source->Urine        All Lab Results:  Results for orders placed or performed during the hospital encounter of 07/14/23   HIV 1/2 Ag/Ab (4th Gen)   Result Value Ref Range    HIV 1/2 Ag/Ab Negative Negative   Hepatitis C Antibody   Result Value Ref Range    Hepatitis C Ab Negative Negative   HCV Virus Hold Specimen   Result Value Ref Range    HEP C Virus Hold Specimen Hold for HCV sendout    CBC W/ AUTO DIFFERENTIAL   Result Value Ref Range    WBC 6.50 3.90 - 12.70 K/uL    RBC 4.27 4.00 - 5.40 M/uL    Hemoglobin 10.5 (L) 12.0 - 16.0 g/dL    Hematocrit 32.3 (L) 37.0 - 48.5 %    MCV 76 (L) 82 - 98 fL    MCH 24.6 (L) 27.0 - 31.0 pg    MCHC 32.5 32.0 - 36.0 g/dL    RDW 14.3 11.5 - 14.5 %    Platelets 169 150 - 450 K/uL    MPV 10.9 9.2 - 12.9 fL    Immature Granulocytes 0.3 0.0 - 0.5 %    Gran # (ANC) 3.6 1.8 - 7.7 K/uL    Immature Grans (Abs) 0.02 0.00 - 0.04 K/uL    Lymph # 2.4 1.0 - 4.8 K/uL    Mono # 0.4 0.3 - 1.0 K/uL    Eos # 0.0 0.0 - 0.5 K/uL    Baso # 0.03 0.00 - 0.20 K/uL    nRBC 0 0 /100 WBC    Gran % 54.6 38.0 - 73.0 %    Lymph % 37.2 18.0 - 48.0 %    Mono % 6.8 4.0 - 15.0 %    Eosinophil % 0.6 0.0 - 8.0 %    Basophil % 0.5 0.0 - 1.9 %     Differential Method Automated    Comp. Metabolic Panel   Result Value Ref Range    Sodium 138 136 - 145 mmol/L    Potassium 3.9 3.5 - 5.1 mmol/L    Chloride 106 95 - 110 mmol/L    CO2 20 (L) 23 - 29 mmol/L    Glucose 83 70 - 110 mg/dL    BUN 14 6 - 20 mg/dL    Creatinine 0.8 0.5 - 1.4 mg/dL    Calcium 9.2 8.7 - 10.5 mg/dL    Total Protein 7.3 6.0 - 8.4 g/dL    Albumin 3.7 3.5 - 5.2 g/dL    Total Bilirubin 0.3 0.1 - 1.0 mg/dL    Alkaline Phosphatase 56 55 - 135 U/L    AST 16 10 - 40 U/L    ALT 11 10 - 44 U/L    eGFR >60 >60 mL/min/1.73 m^2    Anion Gap 12 8 - 16 mmol/L   Lipase   Result Value Ref Range    Lipase 15 4 - 60 U/L   Urinalysis, Reflex to Urine Culture Urine, Clean Catch    Specimen: Urine   Result Value Ref Range    Specimen UA Urine, Clean Catch     Color, UA Yellow Yellow, Straw, Quynh    Appearance, UA Clear Clear    pH, UA 6.0 5.0 - 8.0    Specific Gravity, UA 1.020 1.005 - 1.030    Protein, UA Negative Negative    Glucose, UA Negative Negative    Ketones, UA 1+ (A) Negative    Bilirubin (UA) Negative Negative    Occult Blood UA Negative Negative    Nitrite, UA Negative Negative    Urobilinogen, UA Negative <2.0 EU/dL    Leukocytes, UA Negative Negative   Pregnancy, urine rapid   Result Value Ref Range    Preg Test, Ur Negative          Imaging Results:  Imaging Results    None            The Emergency Provider reviewed the vital signs and test results, which are outlined above.     ED Discussion       5:29 AM: Reassessed pt at this time. Pt states she feels better and would like to be discharged at this time. Discussed with pt all pertinent ED information and results. Discussed pt dx and plan of tx. Gave pt all f/u and return to the ED instructions. All questions and concerns were addressed at this time. Pt expresses understanding of information and instructions, and is comfortable with plan to discharge. Pt is stable for discharge.    I discussed with patient and/or family/caretaker that evaluation  in the ED does not suggest any emergent or life threatening medical conditions requiring immediate intervention beyond what was provided in the ED, and I believe patient is safe for discharge.  Regardless, an unremarkable evaluation in the ED does not preclude the development or presence of a serious of life threatening condition. As such, patient was instructed to return immediately for any worsening or change in current symptoms.         Medical Decision Making:   Clinical Tests:   Lab Tests: Ordered and Reviewed         ED Medication(s):  Medications   sodium chloride 0.9% bolus 1,000 mL 1,000 mL (0 mLs Intravenous Stopped 7/14/23 0441)   prochlorperazine injection Soln 10 mg (10 mg Intravenous Given 7/14/23 0341)   hyoscyamine ODT 0.125 mg (0.125 mg Oral Given 7/14/23 0341)       Discharge Medication List as of 7/14/2023  5:29 AM        START taking these medications    Details   metoclopramide HCl (REGLAN) 10 MG tablet Take 1 tablet (10 mg total) by mouth every 6 (six) hours as needed (nausea/vomiting)., Starting Fri 7/14/2023, Print              Follow-up Information       [facility] [place] In 3 days.    Contact information:  [address] [place] [address] [phone]               [facility] - Emergency Dept..    Specialty: Emergency Medicine  Why: As needed, If symptoms worsen  Contact information:  [address] [facility] [address] [phone]                               Scribe Attestation:   Scribe #1: I performed the above scribed service and the documentation accurately describes the services I performed. I attest to the accuracy of the note.     Attending:   Physician Attestation Statement for Scribe #1: I, Shell Rg MD, personally performed the services described in this documentation, as scribed by Phong Agrawal, in my presence, and it is both accurate and complete.           Clinical Impression       ICD-10-CM ICD-9-CM   1. Generalized abdominal pain   R10.84 789.07       Disposition:   Disposition: Discharged  Condition: Stable       Shell Rg MD  07/17/23 0032       Shell Rg MD  07/17/23 0033

## 2023-09-29 ENCOUNTER — HOSPITAL ENCOUNTER (EMERGENCY)
Facility: HOSPITAL | Age: 28
Discharge: HOME | End: 2023-09-29
Attending: EMERGENCY MEDICINE
Payer: MEDICAID

## 2023-09-29 VITALS
HEART RATE: 79 BPM | TEMPERATURE: 97 F | WEIGHT: 180 LBS | RESPIRATION RATE: 18 BRPM | HEIGHT: 67 IN | BODY MASS INDEX: 28.25 KG/M2 | OXYGEN SATURATION: 99 % | DIASTOLIC BLOOD PRESSURE: 61 MMHG | SYSTOLIC BLOOD PRESSURE: 108 MMHG

## 2023-09-29 DIAGNOSIS — Z34.90 PREGNANCY, UNSPECIFIED GESTATIONAL AGE: Primary | ICD-10-CM

## 2023-09-29 LAB
B-HCG UR QL: POSITIVE
BILIRUB UR QL STRIP: NEGATIVE
CLARITY UR: CLEAR
COLOR UR: YELLOW
CTP QC/QA: YES
GLUCOSE UR QL STRIP: NEGATIVE
HGB UR QL STRIP: NEGATIVE
KETONES UR QL STRIP: ABNORMAL
LEUKOCYTE ESTERASE UR QL STRIP: NEGATIVE
NITRITE UR QL STRIP: NEGATIVE
PH UR STRIP: 6 [PH] (ref 5–8)
PROT UR QL STRIP: ABNORMAL
SP GR UR STRIP: 1.02 (ref 1–1.03)
URN SPEC COLLECT METH UR: ABNORMAL
UROBILINOGEN UR STRIP-ACNC: NEGATIVE EU/DL

## 2023-09-29 PROCEDURE — 99284 EMERGENCY DEPT VISIT MOD MDM: CPT

## 2023-09-29 PROCEDURE — 81025 URINE PREGNANCY TEST: CPT | Performed by: EMERGENCY MEDICINE

## 2023-09-29 PROCEDURE — 25000003 PHARM REV CODE 250

## 2023-09-29 PROCEDURE — 81003 URINALYSIS AUTO W/O SCOPE: CPT | Performed by: EMERGENCY MEDICINE

## 2023-09-29 RX ORDER — ONDANSETRON 4 MG/1
4 TABLET, ORALLY DISINTEGRATING ORAL EVERY 8 HOURS PRN
Qty: 15 TABLET | Refills: 0 | Status: SHIPPED | OUTPATIENT
Start: 2023-09-29

## 2023-09-29 RX ORDER — ONDANSETRON 4 MG/1
4 TABLET, ORALLY DISINTEGRATING ORAL
Status: COMPLETED | OUTPATIENT
Start: 2023-09-29 | End: 2023-09-29

## 2023-09-29 RX ADMIN — ONDANSETRON 4 MG: 4 TABLET, ORALLY DISINTEGRATING ORAL at 11:09

## 2023-09-29 NOTE — ED PROVIDER NOTES
Encounter Date: 2023       History     Chief Complaint   Patient presents with    Abdominal Pain     Pt to ER with mid abd pain x 1 week. Pt reports nausea. LMP 2023. Pt denies urinary complaints      Katy Sweeney is a 27-year-old A1 female with past medical history of spontaneous  who presents to the emergency department with a chief complaint of abdominal pain.  Abdominal pain has been going on for approximately 1 week.  It was located in the suprapubic area and is described as like menstrual cramps but slightly worse.  There is associated nausea that comes and goes.  Denies any frequency, urgency, burning with urination.  Denies any vaginal bleeding or vaginal discharge.  Denies any fever, chills, headache, syncope.  No attempted treatment prior to arrival for her symptoms.    The history is provided by the patient. No  was used.     Review of patient's allergies indicates:  No Known Allergies  History reviewed. No pertinent past medical history.  History reviewed. No pertinent surgical history.  Family History   Problem Relation Age of Onset    Breast cancer Neg Hx     Colon cancer Neg Hx     Ovarian cancer Neg Hx      Social History     Tobacco Use    Smoking status: Never    Smokeless tobacco: Never   Substance Use Topics    Alcohol use: Yes    Drug use: Yes     Types: Marijuana     Review of Systems   Constitutional:  Negative for chills and fever.   HENT:  Negative for sore throat.    Respiratory:  Negative for shortness of breath.    Cardiovascular:  Negative for chest pain.   Gastrointestinal:  Positive for abdominal pain, nausea and vomiting.   Genitourinary:  Negative for dysuria.   Musculoskeletal:  Negative for back pain.   Skin:  Negative for rash.   Neurological:  Negative for weakness and numbness.   Hematological:  Does not bruise/bleed easily.       Physical Exam     Initial Vitals [23 1020]   BP Pulse Resp Temp SpO2   108/61 79 18 97.4 °F (36.3 °C)  99 %      MAP       --         Physical Exam    Nursing note and vitals reviewed.  Constitutional: Vital signs are normal. She appears well-developed and well-nourished. She is cooperative. She does not appear ill. No distress.   Clinically well-appearing, no acute distress.   HENT:   Head: Normocephalic and atraumatic.   Right Ear: Hearing and external ear normal.   Left Ear: Hearing and external ear normal.   Nose: Nose normal.   Eyes: Conjunctivae and EOM are normal.   Neck: Phonation normal.   Normal range of motion.  Cardiovascular:  Normal rate and regular rhythm.           No murmur heard.  Pulmonary/Chest: Effort normal. No respiratory distress.   Abdominal: Abdomen is soft. She exhibits no distension. There is abdominal tenderness in the suprapubic area.   Very mild suprapubic tenderness to palpation.  No rigidity, rebound, guarding.  Bowel sounds are present.   Musculoskeletal:      Cervical back: Normal range of motion.     Neurological: She is alert and oriented to person, place, and time. GCS eye subscore is 4. GCS verbal subscore is 5. GCS motor subscore is 6.   Skin: Skin is warm. Capillary refill takes less than 2 seconds.         ED Course   Procedures  Labs Reviewed   URINALYSIS, REFLEX TO URINE CULTURE - Abnormal; Notable for the following components:       Result Value    Protein, UA Trace (*)     Ketones, UA 2+ (*)     All other components within normal limits    Narrative:     Specimen Source->Urine   POCT URINE PREGNANCY - Abnormal; Notable for the following components:    POC Preg Test, Ur Positive (*)     All other components within normal limits          Imaging Results    None          Medications   ondansetron disintegrating tablet 4 mg (4 mg Oral Given 9/29/23 2768)     Medical Decision Making  27-year-old female presenting to the emergency department with a chief complaint of abdominal pain with associated nausea and vomiting.  No fever, vaginal bleeding, urinary symptoms.  Last  menstrual period 08/16/2023. On physical exam, patient is clinically well-appearing and in no acute distress.  There is minimal tenderness to palpation in the suprapubic area.    Differential diagnosis is broad and includes but is not limited to gastroenteritis, appendicitis, pancreatitis, cholecystitis, diverticulitis, peritonitis, small-bowel obstruction, epiploic appendagitis, constipation, urinary tract infection including cystitis or pyelonephritis, ovarian torsion, ruptured ovarian cyst, pregnancy, ectopic pregnancy, tubo-ovarian abscess, dysmenorrhea, pelvic inflammatory disease, sexually transmitted infection, vaginitis, cervicitis, musculoskeletal pain.     Patient's UPT came back positive.  She was unaware that she was pregnant.  Urinalysis negative for any leukocytes, nitrites.  Doubt asymptomatic bacteriuria, cystitis, pyelonephritis.  Presentation consistent with pregnancy, unknown gestational age.  By last menstrual period, patient is 4-6 weeks along.  I suspect this patient's abdominal pain is round ligament pain or the normal cramping that goes along with pregnancy.  With no vaginal bleeding and only mild intermittent pain, I have a low suspicion for ectopic pregnancy at this time.  Referral placed for follow up with OBGYN.  Prenatal vitamins and Zofran electronically prescribed and sent to the patient's preferred pharmacy.  Return precautions discussed at length and in detail.    Return precautions were discussed, all patient questions were answered, and the patient was agreeable to the plan of care.  She was discharged home in stable condition and will follow up with her primary care provider or return to the emergency department if her symptoms worsen or do not improve.     Amount and/or Complexity of Data Reviewed  Labs: ordered. Decision-making details documented in ED Course.    Risk  OTC drugs.  Prescription drug management.  Diagnosis or treatment significantly limited by social determinants  [facility].                               Clinical Impression:   Final diagnoses:  [Z34.90] Pregnancy, unspecified gestational age (Primary)        ED Disposition Condition    Discharge Stable          ED Prescriptions       Medication Sig Dispense Start Date End Date Auth. Provider    PNV 11-iron fum-folic acid-om3 28 mg iron-1 mg -200 mg Cap Take 1 capsule by mouth once daily. 90 each 9/29/2023 -- Charles Gerard, PA-C    ondansetron (ZOFRAN-ODT) 4 MG TbDL Take 1 tablet (4 mg total) by mouth every 8 (eight) hours as needed. 15 tablet 9/29/2023 -- Charles Gerard, PA-C          Follow-up Information       Follow up With Specialties Details Why Contact [facility]  Schedule an appointment as soon as possible for a visit  As needed, If symptoms worsen [address] [place] [address]  [phone]               Charles Gerard, PA-C  09/29/23 7291

## 2023-09-29 NOTE — DISCHARGE INSTRUCTIONS

## 2023-10-31 ENCOUNTER — HOSPITAL ENCOUNTER (EMERGENCY)
Facility: HOSPITAL | Age: 28
Discharge: HOME | End: 2023-10-31
Attending: EMERGENCY MEDICINE
Payer: MEDICAID

## 2023-10-31 VITALS
OXYGEN SATURATION: 99 % | TEMPERATURE: 98 F | RESPIRATION RATE: 16 BRPM | HEART RATE: 69 BPM | DIASTOLIC BLOOD PRESSURE: 80 MMHG | SYSTOLIC BLOOD PRESSURE: 122 MMHG | WEIGHT: 169.31 LBS | BODY MASS INDEX: 26.52 KG/M2

## 2023-10-31 DIAGNOSIS — O21.9 NAUSEA/VOMITING IN PREGNANCY: Primary | ICD-10-CM

## 2023-10-31 LAB
ALBUMIN SERPL BCP-MCNC: 3.4 G/DL (ref 3.5–5.2)
ALP SERPL-CCNC: 43 U/L (ref 55–135)
ALT SERPL W/O P-5'-P-CCNC: 9 U/L (ref 10–44)
ANION GAP SERPL CALC-SCNC: 11 MMOL/L (ref 8–16)
AST SERPL-CCNC: 13 U/L (ref 10–40)
BASOPHILS # BLD AUTO: 0.02 K/UL (ref 0–0.2)
BASOPHILS NFR BLD: 0.3 % (ref 0–1.9)
BILIRUB SERPL-MCNC: 0.3 MG/DL (ref 0.1–1)
BUN SERPL-MCNC: 7 MG/DL (ref 6–20)
CALCIUM SERPL-MCNC: 8.9 MG/DL (ref 8.7–10.5)
CHLORIDE SERPL-SCNC: 103 MMOL/L (ref 95–110)
CO2 SERPL-SCNC: 22 MMOL/L (ref 23–29)
CREAT SERPL-MCNC: 0.6 MG/DL (ref 0.5–1.4)
DIFFERENTIAL METHOD: ABNORMAL
EOSINOPHIL # BLD AUTO: 0 K/UL (ref 0–0.5)
EOSINOPHIL NFR BLD: 0.6 % (ref 0–8)
ERYTHROCYTE [DISTWIDTH] IN BLOOD BY AUTOMATED COUNT: 13.6 % (ref 11.5–14.5)
EST. GFR  (NO RACE VARIABLE): >60 ML/MIN/1.73 M^2
GLUCOSE SERPL-MCNC: 85 MG/DL (ref 70–110)
HCT VFR BLD AUTO: 33.4 % (ref 37–48.5)
HGB BLD-MCNC: 11.1 G/DL (ref 12–16)
IMM GRANULOCYTES # BLD AUTO: 0.02 K/UL (ref 0–0.04)
IMM GRANULOCYTES NFR BLD AUTO: 0.3 % (ref 0–0.5)
LYMPHOCYTES # BLD AUTO: 1.7 K/UL (ref 1–4.8)
LYMPHOCYTES NFR BLD: 25.5 % (ref 18–48)
MCH RBC QN AUTO: 25.1 PG (ref 27–31)
MCHC RBC AUTO-ENTMCNC: 33.2 G/DL (ref 32–36)
MCV RBC AUTO: 76 FL (ref 82–98)
MONOCYTES # BLD AUTO: 0.5 K/UL (ref 0.3–1)
MONOCYTES NFR BLD: 7.8 % (ref 4–15)
NEUTROPHILS # BLD AUTO: 4.5 K/UL (ref 1.8–7.7)
NEUTROPHILS NFR BLD: 65.5 % (ref 38–73)
NRBC BLD-RTO: 0 /100 WBC
PLATELET # BLD AUTO: 171 K/UL (ref 150–450)
PMV BLD AUTO: 11.8 FL (ref 9.2–12.9)
POTASSIUM SERPL-SCNC: 3.6 MMOL/L (ref 3.5–5.1)
PROT SERPL-MCNC: 7.4 G/DL (ref 6–8.4)
RBC # BLD AUTO: 4.42 M/UL (ref 4–5.4)
SODIUM SERPL-SCNC: 136 MMOL/L (ref 136–145)
WBC # BLD AUTO: 6.82 K/UL (ref 3.9–12.7)

## 2023-10-31 PROCEDURE — 63600175 PHARM REV CODE 636 W HCPCS: Performed by: EMERGENCY MEDICINE

## 2023-10-31 PROCEDURE — 80053 COMPREHEN METABOLIC PANEL: CPT | Performed by: EMERGENCY MEDICINE

## 2023-10-31 PROCEDURE — 96361 HYDRATE IV INFUSION ADD-ON: CPT

## 2023-10-31 PROCEDURE — 96374 THER/PROPH/DIAG INJ IV PUSH: CPT

## 2023-10-31 PROCEDURE — 25000003 PHARM REV CODE 250: Performed by: EMERGENCY MEDICINE

## 2023-10-31 PROCEDURE — 99284 EMERGENCY DEPT VISIT MOD MDM: CPT

## 2023-10-31 PROCEDURE — 85025 COMPLETE CBC W/AUTO DIFF WBC: CPT | Performed by: EMERGENCY MEDICINE

## 2023-10-31 RX ORDER — ONDANSETRON 2 MG/ML
4 INJECTION INTRAMUSCULAR; INTRAVENOUS
Status: COMPLETED | OUTPATIENT
Start: 2023-10-31 | End: 2023-10-31

## 2023-10-31 RX ADMIN — ONDANSETRON 4 MG: 2 INJECTION INTRAMUSCULAR; INTRAVENOUS at 05:10

## 2023-10-31 RX ADMIN — SODIUM CHLORIDE 1000 ML: 9 INJECTION, SOLUTION INTRAVENOUS at 05:10

## 2023-10-31 NOTE — ED PROVIDER NOTES
SCRIBE #1 NOTE: I, Iesha Harley, am scribing for, and in the presence of, Blair Kaur MD. I have scribed the entire note.       History     Chief Complaint   Patient presents with    Nausea     Nausea, fatigue. Patient unable to eat anything without vomiting. Pt is 10 weeks pregnant.      Review of patient's allergies indicates:  No Known Allergies      History of Present Illness     HPI    10/31/2023, 6:19 PM  History obtained from the patient      History of Present Illness: Katy Sweeney is a 28 y.o. female patient who presents to the Emergency Department for evaluation of nausea which onset several weeks PTA. Symptoms are constant and moderate in severity. Pt is 10 weeks pregnant and unable to tolerate PO. She has lost over 15 pounds. Associated sxs include weakness, vomiting. Patient denies any diarrhea, and all other sxs at this time. She has gone to the ER about 3 times for same issue. Her OBGYN is Audrey Smith at [facility]. Zofran has not helped and her last dose was about 3 weeks ago. She has a prescription for Zofran, but has not taken it. No further complaints or concerns at this time.       Arrival mode: Personal vehicle      PCP: [facility]        Past Medical History:  No past medical history on file.    Past Surgical History:  Past Surgical History:   Procedure Laterality Date    DILATION AND CURETTAGE OF UTERUS  2021    MAB 2021         Family History:  Family History   Problem Relation Age of Onset    Breast cancer Neg Hx     Colon cancer Neg Hx     Ovarian cancer Neg Hx        Social History:  Social History     Tobacco Use    Smoking status: Never    Smokeless tobacco: Never   Substance and Sexual Activity    Alcohol use: Yes    Drug use: Yes     Types: Marijuana    Sexual activity: Yes     Partners: Male     Birth control/protection: None        Review of Systems     Review of Systems   Constitutional:  Positive for unexpected weight change (has lost 15  pounds).   Gastrointestinal:  Positive for abdominal pain, nausea and vomiting. Negative for diarrhea.   All other systems reviewed and are negative.     Physical Exam     Initial Vitals [10/31/23 1726]   BP Pulse Resp Temp SpO2   110/72 74 18 97.8 °F (36.6 °C) 99 %      MAP       --          Physical Exam  Nursing Notes and Vital Signs Reviewed.  Constitutional: Patient is in no acute distress. Well-developed and well-nourished.  Head: Atraumatic. Normocephalic.  Eyes: PERRL. EOM intact. Conjunctivae are not pale. No scleral icterus.  ENT: Mucous membranes are moist.    Neck: Supple. Full ROM.   Cardiovascular: Regular rate. Regular rhythm. No murmurs, rubs, or gallops. Distal pulses are 2+ and symmetric.  Pulmonary/Chest: No respiratory distress. Clear to auscultation bilaterally. No wheezing or rales.  Abdominal: Soft and non-distended.  There is no tenderness.  No rebound, guarding, or rigidity.   Genitourinary: No CVA tenderness  Musculoskeletal: Moves all extremities. No obvious deformities. No edema.   Skin: Warm and dry.  Neurological:  Alert, awake, and appropriate.  Normal speech.  No acute focal neurological deficits are appreciated.  Psychiatric: Normal affect. Good eye contact. Appropriate in content.     ED Course   Procedures  ED Vital Signs:  Vitals:    10/31/23 1726 10/31/23 1826   BP: 110/72    Pulse: 74 72   Resp: 18    Temp: 97.8 °F (36.6 °C)    TempSrc: Oral    SpO2: 99% 98%   Weight: 76.8 kg (169 lb 5 oz)        Abnormal Lab Results:  Labs Reviewed   CBC W/ AUTO DIFFERENTIAL - Abnormal; Notable for the following components:       Result Value    Hemoglobin 11.1 (*)     Hematocrit 33.4 (*)     MCV 76 (*)     MCH 25.1 (*)     All other components within normal limits   COMPREHENSIVE METABOLIC PANEL - Abnormal; Notable for the following components:    CO2 22 (*)     Albumin 3.4 (*)     Alkaline Phosphatase 43 (*)     ALT 9 (*)     All other components within normal limits        All Lab  Results:  Results for orders placed or performed during the hospital encounter of 10/31/23   CBC auto differential   Result Value Ref Range    WBC 6.82 3.90 - 12.70 K/uL    RBC 4.42 4.00 - 5.40 M/uL    Hemoglobin 11.1 (L) 12.0 - 16.0 g/dL    Hematocrit 33.4 (L) 37.0 - 48.5 %    MCV 76 (L) 82 - 98 fL    MCH 25.1 (L) 27.0 - 31.0 pg    MCHC 33.2 32.0 - 36.0 g/dL    RDW 13.6 11.5 - 14.5 %    Platelets 171 150 - 450 K/uL    MPV 11.8 9.2 - 12.9 fL    Immature Granulocytes 0.3 0.0 - 0.5 %    Gran # (ANC) 4.5 1.8 - 7.7 K/uL    Immature Grans (Abs) 0.02 0.00 - 0.04 K/uL    Lymph # 1.7 1.0 - 4.8 K/uL    Mono # 0.5 0.3 - 1.0 K/uL    Eos # 0.0 0.0 - 0.5 K/uL    Baso # 0.02 0.00 - 0.20 K/uL    nRBC 0 0 /100 WBC    Gran % 65.5 38.0 - 73.0 %    Lymph % 25.5 18.0 - 48.0 %    Mono % 7.8 4.0 - 15.0 %    Eosinophil % 0.6 0.0 - 8.0 %    Basophil % 0.3 0.0 - 1.9 %    Differential Method Automated    Comprehensive metabolic panel   Result Value Ref Range    Sodium 136 136 - 145 mmol/L    Potassium 3.6 3.5 - 5.1 mmol/L    Chloride 103 95 - 110 mmol/L    CO2 22 (L) 23 - 29 mmol/L    Glucose 85 70 - 110 mg/dL    BUN 7 6 - 20 mg/dL    Creatinine 0.6 0.5 - 1.4 mg/dL    Calcium 8.9 8.7 - 10.5 mg/dL    Total Protein 7.4 6.0 - 8.4 g/dL    Albumin 3.4 (L) 3.5 - 5.2 g/dL    Total Bilirubin 0.3 0.1 - 1.0 mg/dL    Alkaline Phosphatase 43 (L) 55 - 135 U/L    AST 13 10 - 40 U/L    ALT 9 (L) 10 - 44 U/L    eGFR >60 >60 mL/min/1.73 m^2    Anion Gap 11 8 - 16 mmol/L         Imaging Results:  Imaging Results    None                 The Emergency Provider reviewed the vital signs and test results, which are outlined above.     ED Discussion       ED Course as of 10/31/23 1920   Tue Oct 31, 2023   5637 Chart reviewed.  Positive pregnancy test September 29th [DP]      ED Course User Index  [DP] Blair Kaur MD     7:19 PM: Reassessed pt at this time.  She is able to tolerate PO.     Discussed with pt all pertinent ED information and results. Discussed pt  dx and plan of tx. Gave pt all f/u and return to the ED instructions. All questions and concerns were addressed at this time. Pt expresses understanding of information and instructions, and is comfortable with plan to discharge. Pt is stable for discharge.    I discussed with patient and/or family/caretaker that evaluation in the ED does not suggest any emergent or life threatening medical conditions requiring immediate intervention beyond what was provided in the ED, and I believe patient is safe for discharge.  Regardless, an unremarkable evaluation in the ED does not preclude the development or presence of a serious of life threatening condition. As such, patient was instructed to return immediately for any worsening or change in current symptoms.      Medical Decision Making  28-year-old female who presented with reports of nausea/vomiting during pregnancy.  She is 10 weeks per the patient.  She has not been using anything for nausea or vomiting at home despite being prescribed Zofran.  Treated in the emergency department with IV fluids and antiemetics.  She is tolerating p.o..  No vomiting here in the emergency department.  No significant lab abnormalities.  Patient is stable for discharge with antiemetics.  Discussed over-the-counter Unisom and Zofran and follow-up with OBGYN.  ER return precautions provided    Amount and/or Complexity of Data Reviewed  External Data Reviewed: notes.     Details: Positive pregnancy test seen during review of her recent labs  Labs: ordered. Decision-making details documented in ED Course.    Risk  OTC drugs.  Prescription drug management.                ED Medication(s):  Medications   sodium chloride 0.9% bolus 1,000 mL 1,000 mL (1,000 mLs Intravenous New Bag 10/31/23 1750)   ondansetron injection 4 mg (4 mg Intravenous Given 10/31/23 1750)       New Prescriptions    No medications on file               Scribe Attestation:   Scribe #1: I performed the above scribed service and  the documentation accurately describes the services I performed. I attest to the accuracy of the note.     Attending:   Physician Attestation Statement for Scribe #1: I, Blair Kaur MD, personally performed the services described in this documentation, as scribed by Iesha Harley, in my presence, and it is both accurate and complete.           Clinical Impression       ICD-10-CM ICD-9-CM   1. Nausea/vomiting in pregnancy  O21.9 643.90       Disposition:   Disposition: Discharged  Condition: Stable         Blair Kaur MD  11/01/23 1047

## 2023-11-01 NOTE — DISCHARGE INSTRUCTIONS
In addition to your Zofran, you can use over-the-counter Unisom  Take half a tablet every 12 hours as needed for nausea

## 2025-04-11 ENCOUNTER — CLINICAL SUPPORT (OUTPATIENT)
Dept: OBSTETRICS AND GYNECOLOGY | Facility: CLINIC | Age: 30
End: 2025-04-11
Payer: MEDICAID

## 2025-04-11 ENCOUNTER — OFFICE VISIT (OUTPATIENT)
Dept: OBSTETRICS AND GYNECOLOGY | Facility: CLINIC | Age: 30
End: 2025-04-11
Payer: MEDICAID

## 2025-04-11 DIAGNOSIS — Z12.39 SCREENING BREAST EXAMINATION: ICD-10-CM

## 2025-04-11 DIAGNOSIS — Z30.013 ENCOUNTER FOR INITIAL PRESCRIPTION OF INJECTABLE CONTRACEPTIVE: ICD-10-CM

## 2025-04-11 DIAGNOSIS — Z01.419 WOMEN'S ANNUAL ROUTINE GYNECOLOGICAL EXAMINATION: Primary | ICD-10-CM

## 2025-04-11 DIAGNOSIS — Z30.42 ENCOUNTER FOR MANAGEMENT AND INJECTION OF DEPO-PROVERA: Primary | ICD-10-CM

## 2025-04-11 DIAGNOSIS — Z11.3 ENCOUNTER FOR SCREENING EXAMINATION FOR SEXUALLY TRANSMITTED DISEASE: ICD-10-CM

## 2025-04-11 LAB
B-HCG UR QL: NEGATIVE
CTP QC/QA: YES

## 2025-04-11 PROCEDURE — 99999 PR PBB SHADOW E&M-EST. PATIENT-LVL I: CPT | Mod: PBBFAC,,, | Performed by: PHYSICIAN ASSISTANT

## 2025-04-11 PROCEDURE — 99211 OFF/OP EST MAY X REQ PHY/QHP: CPT | Mod: PBBFAC | Performed by: PHYSICIAN ASSISTANT

## 2025-04-11 RX ORDER — MEDROXYPROGESTERONE ACETATE 150 MG/ML
150 INJECTION, SUSPENSION INTRAMUSCULAR
Status: SHIPPED | OUTPATIENT
Start: 2025-04-11 | End: 2026-04-06

## 2025-04-11 RX ADMIN — MEDROXYPROGESTERONE ACETATE 150 MG: 150 INJECTION, SUSPENSION INTRAMUSCULAR at 03:04

## 2025-04-11 NOTE — PROGRESS NOTES
"HISTORY OF PRESENT ILLNESS:    Katy Sweeney is a 29 y.o. female, , Patient's last menstrual period was 2025.,  presents for a routine exam. She uses nothing for contraception, wants to start depo. She does want STD screening.  Reports GYN complaints.  Has vaginal odor.   The patient participates in regular exercise: Yes.  The patient does not smoke.  The patient doesn't wear seatbelts.   Pt denies any domestic violence. Yes -  tattoos or blood transfusions    SCREENING:   Pap:  NILM   Mammogram: N/A  Colonoscopy: N/A   DEXA:  N/A     GYN FH:  Breast cancer: none  Colon cancer: Uncle   Ovarian cancer: none  Endometrial cancer: none    COMPREHENSIVE GYN HISTORY:    PAP History: Denies abnormal Paps.  Infection History: Denies STDs. Denies PID.  Benign History: Denies uterine fibroids. Denies ovarian cysts. Denies endometriosis. Denies other conditions.  Cancer History: Denies cervical cancer. Denies uterine cancer or hyperplasia. Denies ovarian cancer. Denies vulvar cancer or pre-cancer. Denies vaginal cancer or pre-cancer. Denies breast cancer. Denies colon cancer.  Sexual Activity History: Reports currently being sexually active  Menstrual History: Monthly, mild-moderate.  Contraception:no method      Katy Sweeney is a 28 y.o.  who presents for a postpartum visit.  She is status post     9 weeks ago. Baby girl "Edmund" Her hospitalization was not complicated.  She is not breastfeeding.   She desires abstinence for contraception.  She reports signs and symptoms of postpartum depression.  She has had frequent crying spells and sleep disturbance.  She denies SI/HI. Discussed postpartum depression and medication, patient agrees to trial of Zoloft. Reports some vulvovaginal irritation after shaving 2 days ago.  There is discharge and irritation surrounding the clitoral area.         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 No swelling. No fatigue. No fever.  CARDIOVASCULAR: No chest pain. No shortness of breath. No leg cramps.   NEUROLOGICAL: No headaches. No vision changes.  BREASTS: No pain. No lumps. No discharge.  ABDOMEN: No pain. No nausea. No vomiting. No diarrhea. No constipation.  REPRODUCTIVE: No abnormal bleeding.   VULVA: No pain. No lesions. No itching.  VAGINA: No relaxation. No itching. No odor. No discharge. No lesions.  URINARY: No incontinence. No nocturia. No frequency. No dysuria.    BP (P) 100/72   Wt (P) 86 kg (189 lb 9.5 oz)   LMP 03/20/2025   BMI (P) 30.60 kg/m²     PE:  APPEARANCE: Well nourished, well developed, in no acute distress.  AFFECT: WNL, alert and oriented x 3.  SKIN: No acne or hirsutism.  NECK: Neck symmetric, without masses or thyromegaly.  NODES: No inguinal, cervical, axillary or femoral lymph node enlargement.  CHEST: Good respiratory effort.   ABDOMEN: Soft. No tenderness or masses. No hepatosplenomegaly. No hernias.  BREASTS: Symmetrical, no skin changes, visible lesions, palpable masses or nipple discharge bilaterally.  PELVIC: External female genitalia without lesions.  Female hair distribution. Adequate perineal body, Normal urethral meatus. Vagina moist and well rugated without lesions +white  discharge.  No significant cystocele or rectocele present. Cervix pink without lesions, discharge or tenderness. Uterus is normal size, regular, mobile and nontender. Adnexa without masses or tenderness.  EXTREMITIES: No edema      DIAGNOSIS:  1. Women's annual routine gynecological examination        2. Encounter for screening examination for sexually  transmitted disease  Hepatitis B Surface Antigen    Hepatitis C Antibody    HIV 1/2 Ag/Ab (4th Gen)    Vaginosis Screen by DNA Probe    C. trachomatis/N. gonorrhoeae by AMP DNA    Treponema Pallidium Antibodies IgG, IgM      3. Encounter for initial prescription of injectable contraceptive  POCT Urine Pregnancy      4. Screening breast examination            PLAN:  - Pap due in 1 year.  - Screening tests as ordered.  - Diet and exercise encouraged.  Seat belt use encouraged.  Reviewed ASCCP Pap guidelines and screening recommendations.    Counseling: indications for and frequency of periodic gynecologic exam    The patient was counseled today on ACS PAP guidelines, with recommendations for yearly pelvic exams unless their uterus, cervix, and ovaries were removed for benign reasons; in that case, examinations every 3-5 years are recommended.  The patient was also counseled regarding monthly breast self-examination, routine STD screening for at-risk populations, prophylactic immunizations for transmitted infections such as  HPV, Pertussis, or Influenza as appropriate, and yearly mammograms when indicated by ACS guidelines.  She was advised to see her primary care physician for all other health maintenance.    FOLLOW-UP with me annually.   Yasmin Reynolds PA-C           [1]   Current Outpatient Medications:     ferrous sulfate 325 (65 FE) MG EC tablet, Take 1 tablet (325 mg total) by mouth once daily., Disp: 30 tablet, Rfl: 5    fluconazole (DIFLUCAN) 150 MG Tab, Take one tablet today and one tablet 48 hours later., Disp: 2 tablet, Rfl: 0    sertraline (ZOLOFT) 50 MG tablet, Take 1 tablet (50 mg total) by mouth once daily., Disp: 30 tablet, Rfl: 11  [2]   Social History  Socioeconomic History    Marital status: Single   Tobacco Use    Smoking status: Never    Smokeless tobacco: Never   Substance and Sexual Activity    Alcohol use: Yes    Drug use: Yes     Types: Marijuana    Sexual activity: Yes     Partners: Male      Birth control/protection: None     Social Drivers of Health     Financial Resource Strain: Medium Risk (5/8/2023)    Overall Financial Resource Strain (CARDIA)     Difficulty of Paying Living Expenses: Somewhat hard   Food Insecurity: No Food Insecurity (5/24/2024)    Received from [facility]    Hunger Vital Sign     Worried About Running Out of Food in the Last Year: Never true     Ran Out of Food in the Last Year: Never true   Transportation Needs: No Transportation Needs (5/24/2024)    Received from [facility]    PRAPARE - Transportation     Lack of Transportation (Medical): No     Lack of Transportation (Non-Medical): No   Physical Activity: Inactive (5/8/2023)    Exercise Vital Sign     Days of Exercise per Week: 0 days     Minutes of Exercise per Session: 0 min   Stress: Stress Concern Present (5/8/2023)    [employer] - Occupational Stress Questionnaire     Feeling of Stress : Very much   Housing Stability: High Risk (5/8/2023)    Housing Stability Vital Sign     Unable to Pay for Housing in the Last Year: Yes     Unstable Housing in the Last Year: No

## 2025-04-11 NOTE — PROGRESS NOTES
Depo-provera injection administered without difficutly. Pt instructed to sit in waiting room for 15 minutes to monitor for s/s of adverse reaction. Next appointment made, stressed importance of staying within tanisha period. Pt verb understanding.

## 2025-04-21 ENCOUNTER — RESULTS FOLLOW-UP (OUTPATIENT)
Dept: OBSTETRICS AND GYNECOLOGY | Facility: CLINIC | Age: 30
End: 2025-04-21
Payer: MEDICAID

## 2025-04-21 RX ORDER — METRONIDAZOLE 500 MG/1
500 TABLET ORAL EVERY 12 HOURS
Qty: 14 TABLET | Refills: 0 | Status: SHIPPED | OUTPATIENT
Start: 2025-04-21 | End: 2025-04-21

## 2025-04-21 RX ORDER — METRONIDAZOLE 500 MG/1
500 TABLET ORAL EVERY 12 HOURS
Qty: 14 TABLET | Refills: 0 | Status: SHIPPED | OUTPATIENT
Start: 2025-04-21 | End: 2025-04-28